# Patient Record
Sex: MALE | Race: WHITE | NOT HISPANIC OR LATINO | Employment: OTHER | URBAN - NONMETROPOLITAN AREA
[De-identification: names, ages, dates, MRNs, and addresses within clinical notes are randomized per-mention and may not be internally consistent; named-entity substitution may affect disease eponyms.]

---

## 2023-01-27 PROBLEM — K21.9 GASTROESOPHAGEAL REFLUX DISEASE: Status: ACTIVE | Noted: 2023-01-27

## 2023-01-27 PROBLEM — R19.00 PELVIC MASS IN MALE: Status: ACTIVE | Noted: 2023-01-27

## 2023-01-27 PROBLEM — F32.1 DEPRESSION, MAJOR, SINGLE EPISODE, MODERATE (MULTI): Status: ACTIVE | Noted: 2023-01-27

## 2023-01-27 PROBLEM — M48.061 NEUROFORAMINAL STENOSIS OF LUMBAR SPINE: Status: ACTIVE | Noted: 2023-01-27

## 2023-01-27 PROBLEM — I10 BENIGN ESSENTIAL HTN: Status: ACTIVE | Noted: 2023-01-27

## 2023-01-27 PROBLEM — D64.9 CHRONIC ANEMIA: Status: ACTIVE | Noted: 2023-01-27

## 2023-01-27 PROBLEM — E03.8 SUBCLINICAL HYPOTHYROIDISM: Status: ACTIVE | Noted: 2023-01-27

## 2023-01-27 PROBLEM — K44.9 HIATAL HERNIA: Status: ACTIVE | Noted: 2023-01-27

## 2023-01-27 PROBLEM — E55.9 VITAMIN D DEFICIENCY: Status: ACTIVE | Noted: 2023-01-27

## 2023-01-27 PROBLEM — M25.852 MASS OF JOINT OF LEFT HIP: Status: ACTIVE | Noted: 2023-01-27

## 2023-01-27 PROBLEM — L82.1 SEBORRHEIC KERATOSES: Status: ACTIVE | Noted: 2023-01-27

## 2023-01-27 PROBLEM — M79.2: Status: ACTIVE | Noted: 2023-01-27

## 2023-01-27 PROBLEM — K63.5 POLYP OF COLON: Status: ACTIVE | Noted: 2023-01-27

## 2023-01-27 PROBLEM — S46.212A RUPTURE OF LEFT PROXIMAL BICEPS TENDON: Status: ACTIVE | Noted: 2023-01-27

## 2023-01-27 PROBLEM — L57.0 ACTINIC KERATOSIS: Status: ACTIVE | Noted: 2023-01-27

## 2023-01-27 PROBLEM — C44.90 SKIN CANCER: Status: ACTIVE | Noted: 2023-01-27

## 2023-01-27 PROBLEM — K44.9 HERNIA, PARAESOPHAGEAL: Status: ACTIVE | Noted: 2023-01-27

## 2023-01-27 PROBLEM — K43.9 ABDOMINAL WALL HERNIA: Status: ACTIVE | Noted: 2023-01-27

## 2023-01-27 PROBLEM — F41.8 SITUATIONAL ANXIETY: Status: ACTIVE | Noted: 2023-01-27

## 2023-01-27 PROBLEM — G89.29 OTHER CHRONIC PAIN: Status: ACTIVE | Noted: 2023-01-27

## 2023-01-27 PROBLEM — N18.30 STAGE 3 CHRONIC KIDNEY DISEASE (MULTI): Status: ACTIVE | Noted: 2023-01-27

## 2023-01-27 PROBLEM — E78.5 HYPERLIPIDEMIA: Status: ACTIVE | Noted: 2023-01-27

## 2023-01-27 PROBLEM — I71.20 THORACIC AORTIC ANEURYSM WITHOUT RUPTURE (CMS-HCC): Status: ACTIVE | Noted: 2023-01-27

## 2023-01-27 RX ORDER — L. ACIDOPHILUS/L.BULGARICUS 1MM CELL
TABLET ORAL
COMMUNITY
Start: 2021-01-05 | End: 2023-05-02 | Stop reason: SDUPTHER

## 2023-01-27 RX ORDER — LOSARTAN POTASSIUM 50 MG/1
1 TABLET ORAL DAILY
COMMUNITY
Start: 2022-03-06 | End: 2023-09-08

## 2023-01-27 RX ORDER — FINASTERIDE 5 MG/1
1 TABLET, FILM COATED ORAL DAILY
COMMUNITY
End: 2023-10-19 | Stop reason: SDUPTHER

## 2023-01-27 RX ORDER — ISOSORBIDE MONONITRATE 30 MG/1
1 TABLET, EXTENDED RELEASE ORAL DAILY
COMMUNITY
Start: 2021-10-18 | End: 2023-11-15 | Stop reason: SDUPTHER

## 2023-01-27 RX ORDER — ASPIRIN 81 MG/1
1 TABLET ORAL DAILY
COMMUNITY
Start: 2021-01-05

## 2023-01-27 RX ORDER — FAMOTIDINE 40 MG/1
1 TABLET, FILM COATED ORAL NIGHTLY
COMMUNITY
Start: 2022-07-07 | End: 2023-05-25 | Stop reason: SDUPTHER

## 2023-01-27 RX ORDER — HYDROCODONE BITARTRATE AND ACETAMINOPHEN 7.5; 325 MG/1; MG/1
1 TABLET ORAL 4 TIMES DAILY
COMMUNITY
End: 2023-04-05 | Stop reason: RX

## 2023-01-27 RX ORDER — CARVEDILOL 6.25 MG/1
2 TABLET ORAL 2 TIMES DAILY
COMMUNITY
Start: 2019-12-16 | End: 2023-05-03 | Stop reason: SDUPTHER

## 2023-01-27 RX ORDER — SIMVASTATIN 40 MG/1
1 TABLET, FILM COATED ORAL NIGHTLY
COMMUNITY
Start: 2019-12-16 | End: 2023-09-20 | Stop reason: SDUPTHER

## 2023-01-27 RX ORDER — ALLOPURINOL 100 MG/1
1 TABLET ORAL DAILY
COMMUNITY
End: 2023-05-03 | Stop reason: SDUPTHER

## 2023-01-27 RX ORDER — NITROGLYCERIN 0.4 MG/1
0.4 TABLET SUBLINGUAL EVERY 5 MIN PRN
COMMUNITY
Start: 2022-01-12

## 2023-01-27 RX ORDER — AZATHIOPRINE 50 MG/1
0.5 TABLET ORAL EVERY OTHER DAY
COMMUNITY
Start: 2021-07-26 | End: 2023-05-25 | Stop reason: SDUPTHER

## 2023-01-27 RX ORDER — BUPROPION HYDROCHLORIDE 150 MG/1
1 TABLET, EXTENDED RELEASE ORAL 2 TIMES DAILY
COMMUNITY
Start: 2019-12-16 | End: 2023-09-08 | Stop reason: SDUPTHER

## 2023-01-27 RX ORDER — OMEPRAZOLE 40 MG/1
1 CAPSULE, DELAYED RELEASE ORAL DAILY
COMMUNITY
Start: 2019-12-16 | End: 2023-03-30 | Stop reason: SDUPTHER

## 2023-01-27 RX ORDER — POLYETHYLENE GLYCOL 3350, SODIUM SULFATE, SODIUM CHLORIDE, POTASSIUM CHLORIDE, ASCORBIC ACID, SODIUM ASCORBATE 140-9-5.2G
KIT ORAL
COMMUNITY
Start: 2022-08-09 | End: 2023-05-02 | Stop reason: ALTCHOICE

## 2023-01-27 RX ORDER — ERGOCALCIFEROL 1.25 MG/1
1 CAPSULE ORAL
COMMUNITY
Start: 2021-01-05 | End: 2023-05-02 | Stop reason: ALTCHOICE

## 2023-01-27 RX ORDER — TAMSULOSIN HYDROCHLORIDE 0.4 MG/1
1 CAPSULE ORAL NIGHTLY
COMMUNITY
Start: 2019-12-16 | End: 2023-09-08 | Stop reason: SDUPTHER

## 2023-02-24 LAB
CREATININE (MG/DL) IN SER/PLAS: 1.71 MG/DL (ref 0.5–1.3)
GFR MALE: 43 ML/MIN/1.73M2
UREA NITROGEN (MG/DL) IN SER/PLAS: 12 MG/DL (ref 6–23)

## 2023-03-10 PROBLEM — Z96.649 PAIN IN HIP REGION AFTER TOTAL HIP REPLACEMENT (CMS-HCC): Status: ACTIVE | Noted: 2023-03-10

## 2023-03-10 PROBLEM — R07.89 PRESSURE IN CHEST: Status: ACTIVE | Noted: 2023-03-10

## 2023-03-10 PROBLEM — Z96.60 HISTORY OF JOINT REPLACEMENT: Status: ACTIVE | Noted: 2023-03-10

## 2023-03-10 PROBLEM — M79.604 PAIN OF RIGHT LOWER EXTREMITY: Status: ACTIVE | Noted: 2023-03-10

## 2023-03-10 PROBLEM — S36.81XA: Status: ACTIVE | Noted: 2023-03-10

## 2023-03-10 PROBLEM — R10.9 ABDOMINAL WALL PAIN: Status: ACTIVE | Noted: 2023-03-10

## 2023-03-10 PROBLEM — R11.0 NAUSEA IN ADULT: Status: ACTIVE | Noted: 2023-03-10

## 2023-03-10 PROBLEM — T84.84XA PAIN IN HIP REGION AFTER TOTAL HIP REPLACEMENT (CMS-HCC): Status: ACTIVE | Noted: 2023-03-10

## 2023-03-10 PROBLEM — R10.11 RIGHT UPPER QUADRANT ABDOMINAL PAIN: Status: ACTIVE | Noted: 2023-03-10

## 2023-03-10 PROBLEM — F11.90 OPIOID USE: Status: ACTIVE | Noted: 2023-03-10

## 2023-03-10 RX ORDER — CALCITRIOL 0.25 UG/1
0.25 CAPSULE ORAL 3 TIMES WEEKLY
COMMUNITY
End: 2023-04-05

## 2023-03-10 RX ORDER — SELENIUM 50 MCG
1 TABLET ORAL EVERY OTHER DAY
COMMUNITY
End: 2024-01-11 | Stop reason: SDUPTHER

## 2023-03-10 RX ORDER — ONDANSETRON 4 MG/1
4 TABLET, FILM COATED ORAL EVERY 8 HOURS PRN
COMMUNITY
Start: 2022-09-08 | End: 2023-06-14 | Stop reason: ALTCHOICE

## 2023-03-10 RX ORDER — ASPIRIN 325 MG
50000 TABLET, DELAYED RELEASE (ENTERIC COATED) ORAL
COMMUNITY

## 2023-03-10 RX ORDER — FLUTICASONE PROPIONATE 50 MCG
1 SPRAY, SUSPENSION (ML) NASAL DAILY PRN
COMMUNITY

## 2023-03-13 ENCOUNTER — OFFICE VISIT (OUTPATIENT)
Dept: PRIMARY CARE | Facility: CLINIC | Age: 68
End: 2023-03-13
Payer: MEDICARE

## 2023-03-13 VITALS
DIASTOLIC BLOOD PRESSURE: 86 MMHG | SYSTOLIC BLOOD PRESSURE: 134 MMHG | HEART RATE: 78 BPM | HEIGHT: 67 IN | WEIGHT: 131 LBS | OXYGEN SATURATION: 97 % | BODY MASS INDEX: 20.56 KG/M2

## 2023-03-13 DIAGNOSIS — K44.9 HIATAL HERNIA: ICD-10-CM

## 2023-03-13 DIAGNOSIS — E55.9 VITAMIN D DEFICIENCY: ICD-10-CM

## 2023-03-13 DIAGNOSIS — I10 BENIGN ESSENTIAL HTN: Primary | ICD-10-CM

## 2023-03-13 DIAGNOSIS — N18.31 STAGE 3A CHRONIC KIDNEY DISEASE (MULTI): ICD-10-CM

## 2023-03-13 DIAGNOSIS — D64.9 CHRONIC ANEMIA: ICD-10-CM

## 2023-03-13 DIAGNOSIS — R10.9 ABDOMINAL WALL PAIN: ICD-10-CM

## 2023-03-13 DIAGNOSIS — I71.21 ANEURYSM OF ASCENDING AORTA WITHOUT RUPTURE (CMS-HCC): ICD-10-CM

## 2023-03-13 DIAGNOSIS — F32.1 DEPRESSION, MAJOR, SINGLE EPISODE, MODERATE (MULTI): ICD-10-CM

## 2023-03-13 DIAGNOSIS — E03.8 SUBCLINICAL HYPOTHYROIDISM: ICD-10-CM

## 2023-03-13 DIAGNOSIS — M25.852 MASS OF JOINT OF LEFT HIP: ICD-10-CM

## 2023-03-13 PROCEDURE — 3075F SYST BP GE 130 - 139MM HG: CPT | Performed by: FAMILY MEDICINE

## 2023-03-13 PROCEDURE — 1160F RVW MEDS BY RX/DR IN RCRD: CPT | Performed by: FAMILY MEDICINE

## 2023-03-13 PROCEDURE — 1170F FXNL STATUS ASSESSED: CPT | Performed by: FAMILY MEDICINE

## 2023-03-13 PROCEDURE — 1159F MED LIST DOCD IN RCRD: CPT | Performed by: FAMILY MEDICINE

## 2023-03-13 PROCEDURE — G0439 PPPS, SUBSEQ VISIT: HCPCS | Performed by: FAMILY MEDICINE

## 2023-03-13 PROCEDURE — 3079F DIAST BP 80-89 MM HG: CPT | Performed by: FAMILY MEDICINE

## 2023-03-13 PROCEDURE — 1036F TOBACCO NON-USER: CPT | Performed by: FAMILY MEDICINE

## 2023-03-13 PROCEDURE — 3008F BODY MASS INDEX DOCD: CPT | Performed by: FAMILY MEDICINE

## 2023-03-13 RX ORDER — OXYCODONE HYDROCHLORIDE 5 MG/1
5 TABLET ORAL EVERY 6 HOURS PRN
COMMUNITY
End: 2023-04-05 | Stop reason: SDUPTHER

## 2023-03-13 ASSESSMENT — ACTIVITIES OF DAILY LIVING (ADL)
MANAGING_FINANCES: INDEPENDENT
DOING_HOUSEWORK: INDEPENDENT
DRESSING: INDEPENDENT
BATHING: INDEPENDENT
GROCERY_SHOPPING: INDEPENDENT
TAKING_MEDICATION: INDEPENDENT

## 2023-03-13 ASSESSMENT — PATIENT HEALTH QUESTIONNAIRE - PHQ9
SUM OF ALL RESPONSES TO PHQ9 QUESTIONS 1 AND 2: 0
2. FEELING DOWN, DEPRESSED OR HOPELESS: NOT AT ALL
1. LITTLE INTEREST OR PLEASURE IN DOING THINGS: NOT AT ALL
1. LITTLE INTEREST OR PLEASURE IN DOING THINGS: NOT AT ALL
2. FEELING DOWN, DEPRESSED OR HOPELESS: NOT AT ALL
SUM OF ALL RESPONSES TO PHQ9 QUESTIONS 1 AND 2: 0

## 2023-03-13 NOTE — PROGRESS NOTES
Subjective   Patient ID: Shahdi Us is a 67 y.o. male who presents for Medicare Annual Wellness Visit Subsequent (3 month medication check, sore throat and b/l earache x1 week, check sore spot on bottom of left foot.).    HPI   Hip pain and arthritis in back. 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 At this point just observing.  Dr Joseph as needed.  Now pain in right hip Still in pain from 3-8. Switched to oxycodone due to lack of availability of [place].  Drops about 4. Will last about 3 hours which is longer.  Can maintain most normal activity with the meds. Had colonoscopy for polyp and subsequent bleeding in abdomen. That has reoslved on last CT.   Had hemoglobin down to 7.8 then up to 11.8 which is baseline      Hypertension and hyperlipidemia - Blood pressure has been 130s/70s No side effects. No Chest pain, Dyspnea, palpitations, numbness, weakness, edema, claudication, or double vision/ loss of vision. Dr Perez changed him from amlodipine to Losartan. No swelling with losartan. And back on simvastatin with good Lipid in June.      Depression and situation anxiety is doing well on current medications. Had been losing weight at about 20 pounds over last 1 1/2 years. Now stable at BMI 20 which is actually up a bit.  Appetite was off since he had COVID-19 but better now. . Funny taste is better. He has mildly low proteins and mild CKD with anemia in June.  Still abdomen pain but CT did not show anything new to explain tht      Gout has not been an issue on allopurinol.  UA 4.4      CKD 3a and transplant 1979 - Stable at 51 last  time. Was in 40s. No blood. Mild anemia. Azthtioprine. Dr Perez follows     Subclinical hypothyroidism last year. TSH normal last  time      Vitamin D deficiency good on supplement weekly. 52 in June      GERD and HH - No HB, Melena, dysphagia, or hematochezia. On Omeprazole and famotidine.  Has diffuse abdomen pain.. Comes and goes. BM OK. No change with food    "  Chronic anemia - stable last time      AKs on nose and ears and hands - Dr Casanova cut one on hand, right arm and neck. The one on hand SCC.  The others benign      TAA 4.3 in 2022 but only 4.1 in September last year.      Ruptured biceps tendon - did see NP with Dr Joseph's office and was given option to repair or just leave it. It is feeling better. No change in strength. Still able to carry bucket.     Spinal stenosis - keeps awake and pain meds help that .      UDS 8/6/21 as expected for medication profile 9/12/22 as expected for medication profile  CSA 9/12/22  PSA 12/13/22  Colonoscopy 10/28/22     I have personally reviewed the patients OARRS report. This report is filed in the EHR. I have considered the risks of abuse, addiction and diversion. I believe it is clinically appropriate to continue to prescribe this medication.    Review of Systems    Objective   /86 (BP Location: Right arm, Patient Position: Sitting)   Pulse 78   Ht 1.695 m (5' 6.75\")   Wt 59.4 kg (131 lb)   SpO2 97%   BMI 20.67 kg/m²     Physical Exam  Constitutional:       Appearance: Normal appearance. He is not ill-appearing.   HENT:      Right Ear: Tympanic membrane, ear canal and external ear normal.      Left Ear: Tympanic membrane, ear canal and external ear normal.      Mouth/Throat:      Mouth: Mucous membranes are moist.      Pharynx: Oropharynx is clear. No posterior oropharyngeal erythema.      Tonsils: No tonsillar exudate.   Eyes:      Pupils: Pupils are equal, round, and reactive to light.   Neck:      Vascular: No carotid bruit.   Cardiovascular:      Rate and Rhythm: Normal rate and regular rhythm.      Pulses:           Posterior tibial pulses are 3+ on the right side and 3+ on the left side.      Heart sounds: Normal heart sounds.   Pulmonary:      Breath sounds: Normal breath sounds and air entry. No decreased breath sounds, wheezing, rhonchi or rales.   Abdominal:      General: Abdomen is flat. Bowel sounds are " normal.      Palpations: Abdomen is soft.      Tenderness: There is no abdominal tenderness.      Comments: Has solid mass LLQ and Kidney RLQ    Musculoskeletal:      Cervical back: Full passive range of motion without pain.      Right lower leg: No edema.      Left lower leg: No edema.   Lymphadenopathy:      Cervical: No cervical adenopathy.   Skin:     General: Skin is warm and dry.      Comments: Multiple AK on face and ears.   Shaved of callus on left forefoot    Neurological:      Mental Status: He is alert and oriented to person, place, and time.   Psychiatric:         Mood and Affect: Mood normal.         Behavior: Behavior normal.         Thought Content: Thought content normal.         Judgment: Judgment normal.         Assessment/Plan

## 2023-03-30 ENCOUNTER — TELEPHONE (OUTPATIENT)
Dept: PRIMARY CARE | Facility: CLINIC | Age: 68
End: 2023-03-30
Payer: MEDICARE

## 2023-03-30 DIAGNOSIS — K21.9 GASTROESOPHAGEAL REFLUX DISEASE, UNSPECIFIED WHETHER ESOPHAGITIS PRESENT: ICD-10-CM

## 2023-03-30 RX ORDER — OMEPRAZOLE 40 MG/1
40 CAPSULE, DELAYED RELEASE ORAL
Qty: 30 CAPSULE | Refills: 11 | Status: SHIPPED | OUTPATIENT
Start: 2023-03-30 | End: 2024-04-16 | Stop reason: SDUPTHER

## 2023-04-03 DIAGNOSIS — N18.30 CHRONIC KIDNEY DISEASE, STAGE 3 UNSPECIFIED (MULTI): ICD-10-CM

## 2023-04-03 DIAGNOSIS — G89.29 OTHER CHRONIC PAIN: ICD-10-CM

## 2023-04-05 RX ORDER — CALCITRIOL 0.25 UG/1
0.25 CAPSULE ORAL EVERY OTHER DAY
Qty: 45 CAPSULE | Refills: 3 | Status: SHIPPED | OUTPATIENT
Start: 2023-04-05 | End: 2023-05-02 | Stop reason: ALTCHOICE

## 2023-04-05 RX ORDER — OXYCODONE HYDROCHLORIDE 5 MG/1
5 TABLET ORAL EVERY 6 HOURS PRN
Qty: 120 TABLET | Refills: 0 | Status: SHIPPED | OUTPATIENT
Start: 2023-04-05 | End: 2023-05-03 | Stop reason: SDUPTHER

## 2023-04-17 ENCOUNTER — TELEPHONE (OUTPATIENT)
Dept: PRIMARY CARE | Facility: CLINIC | Age: 68
End: 2023-04-17
Payer: MEDICARE

## 2023-04-17 NOTE — TELEPHONE ENCOUNTER
Reporting pain in side into groin where he had a procedure completed, should he make an appointment or go to ED?

## 2023-04-18 ENCOUNTER — APPOINTMENT (OUTPATIENT)
Dept: PRIMARY CARE | Facility: CLINIC | Age: 68
End: 2023-04-18
Payer: MEDICARE

## 2023-05-02 ENCOUNTER — OFFICE VISIT (OUTPATIENT)
Dept: PRIMARY CARE | Facility: CLINIC | Age: 68
End: 2023-05-02
Payer: MEDICARE

## 2023-05-02 VITALS
WEIGHT: 132 LBS | DIASTOLIC BLOOD PRESSURE: 84 MMHG | HEART RATE: 73 BPM | OXYGEN SATURATION: 97 % | SYSTOLIC BLOOD PRESSURE: 138 MMHG | BODY MASS INDEX: 20.83 KG/M2

## 2023-05-02 DIAGNOSIS — R10.31 CHRONIC RIGHT LOWER QUADRANT PAIN: Primary | ICD-10-CM

## 2023-05-02 DIAGNOSIS — G89.29 CHRONIC RIGHT LOWER QUADRANT PAIN: Primary | ICD-10-CM

## 2023-05-02 DIAGNOSIS — L82.1 KERATOSIS, SEBORRHEIC: ICD-10-CM

## 2023-05-02 PROCEDURE — 1159F MED LIST DOCD IN RCRD: CPT | Performed by: FAMILY MEDICINE

## 2023-05-02 PROCEDURE — 1160F RVW MEDS BY RX/DR IN RCRD: CPT | Performed by: FAMILY MEDICINE

## 2023-05-02 PROCEDURE — 3075F SYST BP GE 130 - 139MM HG: CPT | Performed by: FAMILY MEDICINE

## 2023-05-02 PROCEDURE — 1036F TOBACCO NON-USER: CPT | Performed by: FAMILY MEDICINE

## 2023-05-02 PROCEDURE — 3079F DIAST BP 80-89 MM HG: CPT | Performed by: FAMILY MEDICINE

## 2023-05-02 PROCEDURE — 3008F BODY MASS INDEX DOCD: CPT | Performed by: FAMILY MEDICINE

## 2023-05-02 PROCEDURE — 99214 OFFICE O/P EST MOD 30 MIN: CPT | Performed by: FAMILY MEDICINE

## 2023-05-02 ASSESSMENT — PATIENT HEALTH QUESTIONNAIRE - PHQ9
2. FEELING DOWN, DEPRESSED OR HOPELESS: NOT AT ALL
1. LITTLE INTEREST OR PLEASURE IN DOING THINGS: NOT AT ALL
SUM OF ALL RESPONSES TO PHQ9 QUESTIONS 1 AND 2: 0

## 2023-05-02 NOTE — PROGRESS NOTES
Subjective   Patient ID: Shahid Us is a 67 y.o. male who presents for Follow-up (ER for lower abdominal pain, check spot on top of head).    HPI   To ER on 4/18/23 due to pain in RLQ into the groin.   Had been having the pain off and on for several months  Got worse that day  Bowels WNL  No blood or melena  BM and Urination diminish pain afterwards  No change while urinating or BM  Had CT and labs done  CT with same iliopsoas mass on left.  Large hiatal hernia with colon and stomach  CBC, CMP, Lipase, UA, lactate All unremarkable  Testicle does seem to hurt.     The pain is still coming and going and today not too bad  No fever or chills or nausea or   Seems to have started after the colonoscopy last October when he had bleeding from the procedure in spite of no biopsy   Felt terrible afterwards and admitted for a few days to observe.       Review of Systems    Objective   /84 (BP Location: Left arm, Patient Position: Sitting)   Pulse 73   Wt 59.9 kg (132 lb)   SpO2 97%   BMI 20.83 kg/m²     Physical Exam  Constitutional:       General: He is not in acute distress.     Appearance: Normal appearance. He is normal weight. He is not ill-appearing.   HENT:      Head: Normocephalic.   Cardiovascular:      Rate and Rhythm: Normal rate and regular rhythm.      Heart sounds: No murmur heard.  Pulmonary:      Effort: Pulmonary effort is normal. No respiratory distress.      Breath sounds: Normal breath sounds. No wheezing or rhonchi.   Abdominal:      General: Abdomen is flat.      Palpations: Abdomen is soft. There is mass (LLQ as before).      Tenderness: There is no abdominal tenderness.   Genitourinary:     Penis: Normal.       Testes: Normal.      Comments: No swelling or tenderness of epididymi.   Atrophy of testicles.   Does have an incisional hernia of the site where the kidney was placed.   Skin:     Findings: Lesion (on top of head verruciform lesion with waxy surface about 8 mm) present.    Neurological:      Mental Status: He is alert.         Assessment/Plan   Problem List Items Addressed This Visit       Chronic right lower quadrant pain - Primary  This appears to be due to scar tissue. In the future it will still come and go. As long as still having BM and urination can observe. If distention then needs eval again.     Seborrheic keratosis - this is benign but can be cauterized if bothersome.

## 2023-05-03 ENCOUNTER — TELEPHONE (OUTPATIENT)
Dept: PRIMARY CARE | Facility: CLINIC | Age: 68
End: 2023-05-03
Payer: MEDICARE

## 2023-05-03 DIAGNOSIS — Z87.39 HISTORY OF GOUT: ICD-10-CM

## 2023-05-03 DIAGNOSIS — Z87.39 PERSONAL HISTORY OF OTHER DISEASES OF THE MUSCULOSKELETAL SYSTEM AND CONNECTIVE TISSUE: ICD-10-CM

## 2023-05-03 DIAGNOSIS — Z86.79 HISTORY OF CAD (CORONARY ARTERY DISEASE): ICD-10-CM

## 2023-05-03 DIAGNOSIS — I10 HYPERTENSION, UNSPECIFIED TYPE: ICD-10-CM

## 2023-05-03 DIAGNOSIS — Z86.79 PERSONAL HISTORY OF OTHER DISEASES OF THE CIRCULATORY SYSTEM: ICD-10-CM

## 2023-05-03 DIAGNOSIS — G89.29 OTHER CHRONIC PAIN: ICD-10-CM

## 2023-05-03 RX ORDER — OXYCODONE HYDROCHLORIDE 5 MG/1
5 TABLET ORAL EVERY 6 HOURS PRN
Qty: 120 TABLET | Refills: 0 | Status: SHIPPED | OUTPATIENT
Start: 2023-05-03 | End: 2023-06-02 | Stop reason: SDUPTHER

## 2023-05-03 RX ORDER — CARVEDILOL 6.25 MG/1
12.5 TABLET ORAL 2 TIMES DAILY
Qty: 360 TABLET | Refills: 3 | Status: SHIPPED | OUTPATIENT
Start: 2023-05-03 | End: 2024-05-06 | Stop reason: SDUPTHER

## 2023-05-03 RX ORDER — ALLOPURINOL 100 MG/1
TABLET ORAL
Qty: 90 TABLET | Refills: 3 | OUTPATIENT
Start: 2023-05-03

## 2023-05-03 RX ORDER — CARVEDILOL 6.25 MG/1
TABLET ORAL
Qty: 360 TABLET | Refills: 3 | OUTPATIENT
Start: 2023-05-03

## 2023-05-03 RX ORDER — ALLOPURINOL 100 MG/1
100 TABLET ORAL DAILY
Qty: 90 TABLET | Refills: 3 | Status: SHIPPED | OUTPATIENT
Start: 2023-05-03 | End: 2024-05-06 | Stop reason: SDUPTHER

## 2023-05-03 NOTE — TELEPHONE ENCOUNTER
Pt. Called in wanting RF on oxycodone, allopurinol, and carvedilol.  [facility] pharmacy

## 2023-05-22 DIAGNOSIS — K21.9 GASTRO-ESOPHAGEAL REFLUX DISEASE WITHOUT ESOPHAGITIS: ICD-10-CM

## 2023-05-22 RX ORDER — FAMOTIDINE 40 MG/1
TABLET, FILM COATED ORAL
Qty: 90 TABLET | Refills: 3 | OUTPATIENT
Start: 2023-05-22

## 2023-05-24 DIAGNOSIS — N18.30 CHRONIC KIDNEY DISEASE, STAGE 3 UNSPECIFIED (MULTI): ICD-10-CM

## 2023-05-24 RX ORDER — AZATHIOPRINE 50 MG/1
TABLET ORAL
Qty: 23 TABLET | Refills: 2 | OUTPATIENT
Start: 2023-05-24

## 2023-05-25 ENCOUNTER — TELEPHONE (OUTPATIENT)
Dept: PRIMARY CARE | Facility: CLINIC | Age: 68
End: 2023-05-25
Payer: MEDICARE

## 2023-05-25 DIAGNOSIS — K21.9 GASTROESOPHAGEAL REFLUX DISEASE, UNSPECIFIED WHETHER ESOPHAGITIS PRESENT: ICD-10-CM

## 2023-05-25 DIAGNOSIS — N18.31 STAGE 3A CHRONIC KIDNEY DISEASE (MULTI): ICD-10-CM

## 2023-05-25 RX ORDER — FAMOTIDINE 40 MG/1
40 TABLET, FILM COATED ORAL NIGHTLY
Qty: 90 TABLET | Refills: 1 | Status: SHIPPED | OUTPATIENT
Start: 2023-05-25 | End: 2023-11-15 | Stop reason: SDUPTHER

## 2023-05-25 RX ORDER — AZATHIOPRINE 50 MG/1
25 TABLET ORAL EVERY OTHER DAY
Qty: 30 TABLET | Refills: 1 | Status: SHIPPED | OUTPATIENT
Start: 2023-05-25 | End: 2024-01-03 | Stop reason: SDUPTHER

## 2023-06-02 ENCOUNTER — TELEPHONE (OUTPATIENT)
Dept: PRIMARY CARE | Facility: CLINIC | Age: 68
End: 2023-06-02
Payer: MEDICARE

## 2023-06-02 DIAGNOSIS — G89.29 OTHER CHRONIC PAIN: ICD-10-CM

## 2023-06-02 RX ORDER — OXYCODONE HYDROCHLORIDE 5 MG/1
5 TABLET ORAL EVERY 6 HOURS PRN
Qty: 120 TABLET | Refills: 0 | Status: SHIPPED | OUTPATIENT
Start: 2023-06-02 | End: 2023-06-29 | Stop reason: SDUPTHER

## 2023-06-09 ENCOUNTER — LAB (OUTPATIENT)
Dept: LAB | Facility: LAB | Age: 68
End: 2023-06-09
Payer: MEDICARE

## 2023-06-09 DIAGNOSIS — E55.9 VITAMIN D DEFICIENCY: ICD-10-CM

## 2023-06-09 DIAGNOSIS — E03.8 SUBCLINICAL HYPOTHYROIDISM: ICD-10-CM

## 2023-06-09 DIAGNOSIS — I10 BENIGN ESSENTIAL HTN: ICD-10-CM

## 2023-06-09 DIAGNOSIS — D64.9 CHRONIC ANEMIA: ICD-10-CM

## 2023-06-09 DIAGNOSIS — N18.31 STAGE 3A CHRONIC KIDNEY DISEASE (MULTI): ICD-10-CM

## 2023-06-09 LAB
ALANINE AMINOTRANSFERASE (SGPT) (U/L) IN SER/PLAS: 7 U/L (ref 10–52)
ALBUMIN (G/DL) IN SER/PLAS: 3.7 G/DL (ref 3.4–5)
ALKALINE PHOSPHATASE (U/L) IN SER/PLAS: 72 U/L (ref 33–136)
ANION GAP IN SER/PLAS: 11 MMOL/L (ref 10–20)
ASPARTATE AMINOTRANSFERASE (SGOT) (U/L) IN SER/PLAS: 13 U/L (ref 9–39)
BILIRUBIN TOTAL (MG/DL) IN SER/PLAS: 0.5 MG/DL (ref 0–1.2)
CALCIDIOL (25 OH VITAMIN D3) (NG/ML) IN SER/PLAS: 47 NG/ML
CALCIUM (MG/DL) IN SER/PLAS: 8.9 MG/DL (ref 8.6–10.3)
CARBON DIOXIDE, TOTAL (MMOL/L) IN SER/PLAS: 26 MMOL/L (ref 21–32)
CHLORIDE (MMOL/L) IN SER/PLAS: 107 MMOL/L (ref 98–107)
CREATININE (MG/DL) IN SER/PLAS: 1.65 MG/DL (ref 0.5–1.3)
ERYTHROCYTE DISTRIBUTION WIDTH (RATIO) BY AUTOMATED COUNT: 13.2 % (ref 11.5–14.5)
ERYTHROCYTE MEAN CORPUSCULAR HEMOGLOBIN CONCENTRATION (G/DL) BY AUTOMATED: 32 G/DL (ref 32–36)
ERYTHROCYTE MEAN CORPUSCULAR VOLUME (FL) BY AUTOMATED COUNT: 92 FL (ref 80–100)
ERYTHROCYTES (10*6/UL) IN BLOOD BY AUTOMATED COUNT: 4.12 X10E12/L (ref 4.5–5.9)
GFR MALE: 45 ML/MIN/1.73M2
GLUCOSE (MG/DL) IN SER/PLAS: 101 MG/DL (ref 74–99)
HEMATOCRIT (%) IN BLOOD BY AUTOMATED COUNT: 37.8 % (ref 41–52)
HEMOGLOBIN (G/DL) IN BLOOD: 12.1 G/DL (ref 13.5–17.5)
LEUKOCYTES (10*3/UL) IN BLOOD BY AUTOMATED COUNT: 5.5 X10E9/L (ref 4.4–11.3)
PLATELETS (10*3/UL) IN BLOOD AUTOMATED COUNT: 226 X10E9/L (ref 150–450)
POTASSIUM (MMOL/L) IN SER/PLAS: 4.2 MMOL/L (ref 3.5–5.3)
PROTEIN TOTAL: 5.7 G/DL (ref 6.4–8.2)
SODIUM (MMOL/L) IN SER/PLAS: 140 MMOL/L (ref 136–145)
THYROTROPIN (MIU/L) IN SER/PLAS BY DETECTION LIMIT <= 0.05 MIU/L: 8.18 MIU/L (ref 0.44–3.98)
THYROXINE (T4) FREE (NG/DL) IN SER/PLAS: 0.76 NG/DL (ref 0.61–1.12)
UREA NITROGEN (MG/DL) IN SER/PLAS: 10 MG/DL (ref 6–23)

## 2023-06-09 PROCEDURE — 36415 COLL VENOUS BLD VENIPUNCTURE: CPT

## 2023-06-09 PROCEDURE — 85027 COMPLETE CBC AUTOMATED: CPT

## 2023-06-09 PROCEDURE — 82306 VITAMIN D 25 HYDROXY: CPT

## 2023-06-09 PROCEDURE — 84443 ASSAY THYROID STIM HORMONE: CPT

## 2023-06-09 PROCEDURE — 84439 ASSAY OF FREE THYROXINE: CPT

## 2023-06-09 PROCEDURE — 80053 COMPREHEN METABOLIC PANEL: CPT

## 2023-06-13 RX ORDER — LISINOPRIL 5 MG/1
10 TABLET ORAL DAILY
COMMUNITY
Start: 2019-11-19 | End: 2023-06-14 | Stop reason: SINTOL

## 2023-06-14 ENCOUNTER — OFFICE VISIT (OUTPATIENT)
Dept: PRIMARY CARE | Facility: CLINIC | Age: 68
End: 2023-06-14
Payer: MEDICARE

## 2023-06-14 VITALS
OXYGEN SATURATION: 98 % | BODY MASS INDEX: 20.83 KG/M2 | WEIGHT: 132 LBS | SYSTOLIC BLOOD PRESSURE: 138 MMHG | DIASTOLIC BLOOD PRESSURE: 88 MMHG | HEART RATE: 79 BPM

## 2023-06-14 DIAGNOSIS — I10 PRIMARY HYPERTENSION: ICD-10-CM

## 2023-06-14 DIAGNOSIS — T84.84XD PAIN IN HIP REGION AFTER TOTAL HIP REPLACEMENT, SUBSEQUENT ENCOUNTER: ICD-10-CM

## 2023-06-14 DIAGNOSIS — F32.1 DEPRESSION, MAJOR, SINGLE EPISODE, MODERATE (MULTI): ICD-10-CM

## 2023-06-14 DIAGNOSIS — K21.9 GASTROESOPHAGEAL REFLUX DISEASE WITHOUT ESOPHAGITIS: ICD-10-CM

## 2023-06-14 DIAGNOSIS — Z94.0 HISTORY OF KIDNEY TRANSPLANT (HHS-HCC): ICD-10-CM

## 2023-06-14 DIAGNOSIS — E03.8 SUBCLINICAL HYPOTHYROIDISM: Primary | ICD-10-CM

## 2023-06-14 DIAGNOSIS — E78.2 MIXED HYPERLIPIDEMIA: ICD-10-CM

## 2023-06-14 DIAGNOSIS — L57.0 ACTINIC KERATOSIS: ICD-10-CM

## 2023-06-14 DIAGNOSIS — Z96.649 PAIN IN HIP REGION AFTER TOTAL HIP REPLACEMENT, SUBSEQUENT ENCOUNTER: ICD-10-CM

## 2023-06-14 DIAGNOSIS — I71.21 ANEURYSM OF ASCENDING AORTA WITHOUT RUPTURE (CMS-HCC): ICD-10-CM

## 2023-06-14 DIAGNOSIS — M48.061 NEUROFORAMINAL STENOSIS OF LUMBAR SPINE: ICD-10-CM

## 2023-06-14 DIAGNOSIS — K44.9 HIATAL HERNIA: ICD-10-CM

## 2023-06-14 DIAGNOSIS — F11.90 OPIOID USE: ICD-10-CM

## 2023-06-14 DIAGNOSIS — I43 CARDIOMYOPATHY DUE TO HYPERTENSION, WITHOUT HEART FAILURE (MULTI): ICD-10-CM

## 2023-06-14 DIAGNOSIS — Z96.641 HISTORY OF RIGHT HIP REPLACEMENT: ICD-10-CM

## 2023-06-14 DIAGNOSIS — N18.31 STAGE 3A CHRONIC KIDNEY DISEASE (MULTI): ICD-10-CM

## 2023-06-14 DIAGNOSIS — M25.852 MASS OF JOINT OF LEFT HIP: ICD-10-CM

## 2023-06-14 DIAGNOSIS — K63.5 POLYP OF COLON, UNSPECIFIED PART OF COLON, UNSPECIFIED TYPE: ICD-10-CM

## 2023-06-14 DIAGNOSIS — I11.9 CARDIOMYOPATHY DUE TO HYPERTENSION, WITHOUT HEART FAILURE (MULTI): ICD-10-CM

## 2023-06-14 DIAGNOSIS — G89.29 OTHER CHRONIC PAIN: ICD-10-CM

## 2023-06-14 DIAGNOSIS — R10.31 CHRONIC RIGHT LOWER QUADRANT PAIN: ICD-10-CM

## 2023-06-14 DIAGNOSIS — L82.1 SEBORRHEIC KERATOSES: ICD-10-CM

## 2023-06-14 DIAGNOSIS — S46.212D RUPTURE OF LEFT PROXIMAL BICEPS TENDON, SUBSEQUENT ENCOUNTER: ICD-10-CM

## 2023-06-14 DIAGNOSIS — D64.9 CHRONIC ANEMIA: ICD-10-CM

## 2023-06-14 DIAGNOSIS — C44.90 SKIN CANCER: ICD-10-CM

## 2023-06-14 DIAGNOSIS — R19.00 PELVIC MASS IN MALE: ICD-10-CM

## 2023-06-14 DIAGNOSIS — G89.29 CHRONIC RIGHT LOWER QUADRANT PAIN: ICD-10-CM

## 2023-06-14 DIAGNOSIS — F41.8 SITUATIONAL ANXIETY: ICD-10-CM

## 2023-06-14 DIAGNOSIS — M79.604 PAIN OF RIGHT LOWER EXTREMITY: ICD-10-CM

## 2023-06-14 PROBLEM — R11.0 NAUSEA IN ADULT: Status: RESOLVED | Noted: 2023-03-10 | Resolved: 2023-06-14

## 2023-06-14 PROBLEM — R10.11 RIGHT UPPER QUADRANT ABDOMINAL PAIN: Status: RESOLVED | Noted: 2023-03-10 | Resolved: 2023-06-14

## 2023-06-14 PROBLEM — K43.9 ABDOMINAL WALL HERNIA: Status: RESOLVED | Noted: 2023-01-27 | Resolved: 2023-06-14

## 2023-06-14 PROBLEM — M79.2: Status: RESOLVED | Noted: 2023-01-27 | Resolved: 2023-06-14

## 2023-06-14 PROBLEM — R07.89 PRESSURE IN CHEST: Status: RESOLVED | Noted: 2023-03-10 | Resolved: 2023-06-14

## 2023-06-14 PROBLEM — S36.81XA: Status: RESOLVED | Noted: 2023-03-10 | Resolved: 2023-06-14

## 2023-06-14 PROBLEM — R10.9 ABDOMINAL WALL PAIN: Status: RESOLVED | Noted: 2023-03-10 | Resolved: 2023-06-14

## 2023-06-14 PROCEDURE — 1036F TOBACCO NON-USER: CPT | Performed by: FAMILY MEDICINE

## 2023-06-14 PROCEDURE — 3075F SYST BP GE 130 - 139MM HG: CPT | Performed by: FAMILY MEDICINE

## 2023-06-14 PROCEDURE — 1160F RVW MEDS BY RX/DR IN RCRD: CPT | Performed by: FAMILY MEDICINE

## 2023-06-14 PROCEDURE — 99214 OFFICE O/P EST MOD 30 MIN: CPT | Performed by: FAMILY MEDICINE

## 2023-06-14 PROCEDURE — 3008F BODY MASS INDEX DOCD: CPT | Performed by: FAMILY MEDICINE

## 2023-06-14 PROCEDURE — 3079F DIAST BP 80-89 MM HG: CPT | Performed by: FAMILY MEDICINE

## 2023-06-14 PROCEDURE — 1159F MED LIST DOCD IN RCRD: CPT | Performed by: FAMILY MEDICINE

## 2023-06-14 RX ORDER — LEVOTHYROXINE SODIUM 25 UG/1
25 TABLET ORAL DAILY
Qty: 90 TABLET | Refills: 3 | Status: SHIPPED | OUTPATIENT
Start: 2023-06-14 | End: 2023-06-15 | Stop reason: SDUPTHER

## 2023-06-14 ASSESSMENT — PATIENT HEALTH QUESTIONNAIRE - PHQ9
1. LITTLE INTEREST OR PLEASURE IN DOING THINGS: NOT AT ALL
SUM OF ALL RESPONSES TO PHQ9 QUESTIONS 1 AND 2: 0
2. FEELING DOWN, DEPRESSED OR HOPELESS: NOT AT ALL

## 2023-06-14 NOTE — PROGRESS NOTES
Subjective   Patient ID: Shahid Us is a 68 y.o. male who presents for Med Management.    HPI     Hip pain and arthritis in back. 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 At this point just observing.  Bilateral hip replacement. Left unrepaired biceps tendon rupture. Dr Joseph as needed.  Now pain in right hip Still in pain from 3-8. Switched to oxycodone due to lack of availability of [place].  Drops about 3. Will last about 4 hours which is longer.  Can maintain most normal activity with the meds.     Cardiomyopathy - EF in October 2022 45-50% global hypokinesis.     Had colonoscopy for polyp and subsequent bleeding in abdomen. That has reoslved on last CT.   Had hemoglobin down to 7.8 then, now 12.1. Colonoscopy 10/2022      Hypertension and hyperlipidemia - Blood pressure has been 120s/70s No side effects. No Chest pain, Dyspnea, palpitations, numbness, weakness, edema, claudication, or double vision/ loss of vision. Dr Perez changed him from amlodipine to Losartan. No swelling with losartan. And back on simvastatin with good Lipid one year ago.       Depression and situation anxiety is doing well on current medications. Had been losing weight at about 20 pounds over last 1 1/2 years. Now stable at BMI 20 which is actually up a bit.       Gout has not been an issue on allopurinol.  UA 4.4 last time.      CKD 3a and transplant 1979 - GFR 45. Last check was 50. No blood. Mild anemia. Azathioprine. Dr Perez follows     Subclinical Hypothyroid - was 5.4 last year, then 3.78, now 8.18. Free T4 0.76. Reports cold intolerance, fatigue, joint aches, not on levothyroxine/synthroid.. Will start since symptoms       Vitamin D deficiency good on supplement weekly. 52 in June      GERD and HH - No HB, Melena, dysphagia, or hematochezia. On Omeprazole and famotidine.  Has diffuse abdomen pain.. Comes and goes. BM OK. No change with food     Chronic anemia - HGB 12.1     AKs on nose and ears and hands -  Dr Casanova cut one on hand, right arm and neck. The one on hand SCC.  The others benign      TAA 4.2 on 10/2022.      Ruptured biceps tendon - did see NP with Dr Joseph's office and was given option to repair or just leave it. It is feeling better. No change in strength. Still able to carry bucket.     Spinal stenosis - keeps awake and pain meds help that .      UDS 8/6/21 as expected for medication profile 9/12/22 as expected for medication profile  CSA 9/12/22  PSA 12/13/22  Colonoscopy 10/28/22    Review of Systems    Objective   /88 (BP Location: Right arm, Patient Position: Sitting)   Pulse 79   Wt 59.9 kg (132 lb)   SpO2 98%   BMI 20.83 kg/m²     Physical Exam  Constitutional:       Appearance: Normal appearance.   HENT:      Head: Normocephalic.      Right Ear: Tympanic membrane, ear canal and external ear normal.      Left Ear: Tympanic membrane, ear canal and external ear normal.      Nose: Nose normal.      Mouth/Throat:      Mouth: Mucous membranes are moist.   Eyes:      Pupils: Pupils are equal, round, and reactive to light.   Cardiovascular:      Rate and Rhythm: Normal rate and regular rhythm.      Pulses: Normal pulses.      Heart sounds: Normal heart sounds. No murmur heard.  Pulmonary:      Effort: Pulmonary effort is normal.      Breath sounds: Normal breath sounds.   Abdominal:      General: Abdomen is flat.      Palpations: Abdomen is soft. There is mass (left lower abdomen and Right lower abdomen masses. Not tender).   Musculoskeletal:      Cervical back: Normal range of motion.      Right lower leg: No edema.      Left lower leg: No edema.      Comments: Protuberance of left greater trochanter    Skin:     General: Skin is warm and dry.             Comments: Post operative scar   Neurological:      General: No focal deficit present.      Mental Status: He is alert and oriented to person, place, and time.   Psychiatric:         Mood and Affect: Mood normal.         Behavior: Behavior  normal.         Thought Content: Thought content normal.         Judgment: Judgment normal.         Assessment/Plan   Problem List Items Addressed This Visit       Actinic keratosis    Cardiomyopathy due to hypertension (CMS/HCC)    Chronic anemia    Chronic right lower quadrant pain    Depression, major, single episode, moderate (CMS/HCC)    Gastroesophageal reflux disease    Hiatal hernia    History of joint replacement    History of kidney transplant    Hyperlipidemia    Relevant Orders    Lipid Panel    Hypertension    Mass of joint of left hip    Neuroforaminal stenosis of lumbar spine    Opioid use    Other chronic pain    Pain in hip region after total hip replacement (CMS/HCC)    Pain of right lower extremity    Pelvic mass in male    Polyp of colon    Rupture of left proximal biceps tendon    Seborrheic keratoses    Situational anxiety    Skin cancer    Stage 3 chronic kidney disease (CMS/HCC)    Relevant Orders    Basic Metabolic Panel    Subclinical hypothyroidism - Primary    Relevant Medications    levothyroxine (Synthroid, Levoxyl) 25 mcg tablet    Other Relevant Orders    TSH with reflex to Free T4 if abnormal    Follow Up In Primary Care    Thoracic aortic aneurysm without rupture (CMS/HCC)        Patient initially seen by David Roy NP student. I reviewed history and examined patient independently and updated as needed.

## 2023-06-15 ENCOUNTER — TELEPHONE (OUTPATIENT)
Dept: PRIMARY CARE | Facility: CLINIC | Age: 68
End: 2023-06-15
Payer: MEDICARE

## 2023-06-15 DIAGNOSIS — E03.8 SUBCLINICAL HYPOTHYROIDISM: ICD-10-CM

## 2023-06-15 RX ORDER — LEVOTHYROXINE SODIUM 25 UG/1
25 TABLET ORAL DAILY
Qty: 90 TABLET | Refills: 3 | Status: SHIPPED | OUTPATIENT
Start: 2023-06-15 | End: 2023-06-23 | Stop reason: SINTOL

## 2023-06-17 DIAGNOSIS — N18.31 STAGE 3A CHRONIC KIDNEY DISEASE (MULTI): ICD-10-CM

## 2023-06-19 RX ORDER — AZATHIOPRINE 50 MG/1
25 TABLET ORAL EVERY OTHER DAY
Qty: 24 TABLET | Refills: 3 | OUTPATIENT
Start: 2023-06-19

## 2023-06-20 ENCOUNTER — APPOINTMENT (OUTPATIENT)
Dept: PRIMARY CARE | Facility: CLINIC | Age: 68
End: 2023-06-20
Payer: MEDICARE

## 2023-06-21 NOTE — TELEPHONE ENCOUNTER
Stating since starting the thyroid medication his hips are hurting and when he's outside in the sun it feels like its cooking him and when he goes inside his face looks burnt. Asking for a call back.

## 2023-06-23 ENCOUNTER — TELEPHONE (OUTPATIENT)
Dept: PRIMARY CARE | Facility: CLINIC | Age: 68
End: 2023-06-23
Payer: MEDICARE

## 2023-06-29 ENCOUNTER — TELEPHONE (OUTPATIENT)
Dept: PRIMARY CARE | Facility: CLINIC | Age: 68
End: 2023-06-29
Payer: MEDICARE

## 2023-06-29 DIAGNOSIS — G89.29 OTHER CHRONIC PAIN: ICD-10-CM

## 2023-06-29 RX ORDER — OXYCODONE HYDROCHLORIDE 5 MG/1
5 TABLET ORAL EVERY 6 HOURS PRN
Qty: 120 TABLET | Refills: 0 | Status: SHIPPED | OUTPATIENT
Start: 2023-06-29 | End: 2023-08-03 | Stop reason: SDUPTHER

## 2023-07-07 LAB
ANION GAP IN SER/PLAS: 9 MMOL/L (ref 10–20)
CALCIUM (MG/DL) IN SER/PLAS: 9.2 MG/DL (ref 8.6–10.3)
CARBON DIOXIDE, TOTAL (MMOL/L) IN SER/PLAS: 28 MMOL/L (ref 21–32)
CHLORIDE (MMOL/L) IN SER/PLAS: 105 MMOL/L (ref 98–107)
CREATININE (MG/DL) IN SER/PLAS: 1.84 MG/DL (ref 0.5–1.3)
GFR MALE: 40 ML/MIN/1.73M2
GLUCOSE (MG/DL) IN SER/PLAS: 106 MG/DL (ref 74–99)
POTASSIUM (MMOL/L) IN SER/PLAS: 4 MMOL/L (ref 3.5–5.3)
SODIUM (MMOL/L) IN SER/PLAS: 138 MMOL/L (ref 136–145)
UREA NITROGEN (MG/DL) IN SER/PLAS: 16 MG/DL (ref 6–23)

## 2023-07-17 ENCOUNTER — TELEPHONE (OUTPATIENT)
Dept: PRIMARY CARE | Facility: CLINIC | Age: 68
End: 2023-07-17

## 2023-07-17 DIAGNOSIS — N18.31 STAGE 3A CHRONIC KIDNEY DISEASE (MULTI): ICD-10-CM

## 2023-07-17 DIAGNOSIS — G89.29 OTHER CHRONIC PAIN: ICD-10-CM

## 2023-07-17 RX ORDER — AZATHIOPRINE 50 MG/1
25 TABLET ORAL EVERY OTHER DAY
Qty: 24 TABLET | Refills: 3 | OUTPATIENT
Start: 2023-07-17

## 2023-07-31 RX ORDER — OXYCODONE HYDROCHLORIDE 5 MG/1
5 TABLET ORAL EVERY 6 HOURS PRN
Qty: 120 TABLET | Refills: 0 | Status: CANCELLED | OUTPATIENT
Start: 2023-07-31 | End: 2023-08-30

## 2023-08-03 ENCOUNTER — TELEPHONE (OUTPATIENT)
Dept: PRIMARY CARE | Facility: CLINIC | Age: 68
End: 2023-08-03
Payer: MEDICARE

## 2023-08-03 DIAGNOSIS — G89.29 OTHER CHRONIC PAIN: ICD-10-CM

## 2023-08-03 RX ORDER — OXYCODONE HYDROCHLORIDE 5 MG/1
5 TABLET ORAL EVERY 6 HOURS PRN
Qty: 120 TABLET | Refills: 0 | Status: SHIPPED | OUTPATIENT
Start: 2023-08-03 | End: 2023-08-31 | Stop reason: SDUPTHER

## 2023-08-31 ENCOUNTER — TELEPHONE (OUTPATIENT)
Dept: PRIMARY CARE | Facility: CLINIC | Age: 68
End: 2023-08-31
Payer: MEDICARE

## 2023-08-31 DIAGNOSIS — Z86.79 PERSONAL HISTORY OF OTHER DISEASES OF THE CIRCULATORY SYSTEM: ICD-10-CM

## 2023-08-31 DIAGNOSIS — F32.1 MAJOR DEPRESSIVE DISORDER, SINGLE EPISODE, MODERATE (MULTI): ICD-10-CM

## 2023-08-31 DIAGNOSIS — G89.29 OTHER CHRONIC PAIN: ICD-10-CM

## 2023-08-31 RX ORDER — OXYCODONE HYDROCHLORIDE 5 MG/1
5 TABLET ORAL EVERY 6 HOURS PRN
Qty: 120 TABLET | Refills: 0 | Status: SHIPPED | OUTPATIENT
Start: 2023-08-31 | End: 2023-10-02 | Stop reason: SDUPTHER

## 2023-08-31 RX ORDER — BUPROPION HYDROCHLORIDE 150 MG/1
150 TABLET, EXTENDED RELEASE ORAL 2 TIMES DAILY
Qty: 180 TABLET | Refills: 3 | OUTPATIENT
Start: 2023-08-31

## 2023-08-31 RX ORDER — SIMVASTATIN 40 MG/1
40 TABLET, FILM COATED ORAL NIGHTLY
Qty: 90 TABLET | Refills: 3 | OUTPATIENT
Start: 2023-08-31

## 2023-09-05 PROBLEM — R10.9 RECURRENT ABDOMINAL PAIN: Status: ACTIVE | Noted: 2023-09-05

## 2023-09-05 RX ORDER — L. ACIDOPHILUS/L.BULGARICUS 1MM CELL
1 TABLET ORAL 2 TIMES DAILY
COMMUNITY
Start: 2021-01-05

## 2023-09-05 RX ORDER — ERGOCALCIFEROL 1.25 MG/1
1 CAPSULE ORAL WEEKLY
COMMUNITY
Start: 2021-01-05 | End: 2023-09-20 | Stop reason: SDUPTHER

## 2023-09-08 DIAGNOSIS — F32.1 DEPRESSION, MAJOR, SINGLE EPISODE, MODERATE (MULTI): ICD-10-CM

## 2023-09-08 DIAGNOSIS — N40.1 BENIGN PROSTATIC HYPERPLASIA WITH LOWER URINARY TRACT SYMPTOMS, SYMPTOM DETAILS UNSPECIFIED: ICD-10-CM

## 2023-09-08 DIAGNOSIS — I10 PRIMARY HYPERTENSION: ICD-10-CM

## 2023-09-08 RX ORDER — TAMSULOSIN HYDROCHLORIDE 0.4 MG/1
0.4 CAPSULE ORAL NIGHTLY
Qty: 90 CAPSULE | Refills: 1 | Status: SHIPPED | OUTPATIENT
Start: 2023-09-08 | End: 2024-02-27 | Stop reason: SDUPTHER

## 2023-09-08 RX ORDER — LOSARTAN POTASSIUM 50 MG/1
50 TABLET ORAL DAILY
Qty: 90 TABLET | Refills: 1 | Status: SHIPPED | OUTPATIENT
Start: 2023-09-08 | End: 2024-03-12 | Stop reason: SDUPTHER

## 2023-09-08 RX ORDER — BUPROPION HYDROCHLORIDE 150 MG/1
150 TABLET, EXTENDED RELEASE ORAL 2 TIMES DAILY
Qty: 180 TABLET | Refills: 1 | Status: SHIPPED | OUTPATIENT
Start: 2023-09-08 | End: 2024-03-29 | Stop reason: SDUPTHER

## 2023-09-08 NOTE — TELEPHONE ENCOUNTER
PT needs  a refill on tamsulosin (Flomax) 0.4 mg, buPROPion SR (Wellbutrin SR) 150 mg, and losartan (Cozaar) 50 mg sent to [facility] in [place]

## 2023-09-18 ENCOUNTER — LAB (OUTPATIENT)
Dept: LAB | Facility: LAB | Age: 68
End: 2023-09-18
Payer: MEDICARE

## 2023-09-18 DIAGNOSIS — E03.8 SUBCLINICAL HYPOTHYROIDISM: ICD-10-CM

## 2023-09-18 DIAGNOSIS — E78.2 MIXED HYPERLIPIDEMIA: ICD-10-CM

## 2023-09-18 DIAGNOSIS — N18.31 STAGE 3A CHRONIC KIDNEY DISEASE (MULTI): ICD-10-CM

## 2023-09-18 LAB
ANION GAP IN SER/PLAS: 12 MMOL/L (ref 10–20)
CALCIUM (MG/DL) IN SER/PLAS: 9.1 MG/DL (ref 8.6–10.3)
CARBON DIOXIDE, TOTAL (MMOL/L) IN SER/PLAS: 27 MMOL/L (ref 21–32)
CHLORIDE (MMOL/L) IN SER/PLAS: 108 MMOL/L (ref 98–107)
CHOLESTEROL (MG/DL) IN SER/PLAS: 109 MG/DL (ref 0–199)
CHOLESTEROL IN HDL (MG/DL) IN SER/PLAS: 53 MG/DL
CHOLESTEROL/HDL RATIO: 2.1
CREATININE (MG/DL) IN SER/PLAS: 1.43 MG/DL (ref 0.5–1.3)
GFR MALE: 54 ML/MIN/1.73M2
GLUCOSE (MG/DL) IN SER/PLAS: 95 MG/DL (ref 74–99)
LDL: 43 MG/DL (ref 0–99)
POTASSIUM (MMOL/L) IN SER/PLAS: 3.8 MMOL/L (ref 3.5–5.3)
SODIUM (MMOL/L) IN SER/PLAS: 143 MMOL/L (ref 136–145)
THYROTROPIN (MIU/L) IN SER/PLAS BY DETECTION LIMIT <= 0.05 MIU/L: 8.16 MIU/L (ref 0.44–3.98)
THYROXINE (T4) FREE (NG/DL) IN SER/PLAS: 0.79 NG/DL (ref 0.61–1.12)
TRIGLYCERIDE (MG/DL) IN SER/PLAS: 66 MG/DL (ref 0–149)
UREA NITROGEN (MG/DL) IN SER/PLAS: 9 MG/DL (ref 6–23)
VLDL: 13 MG/DL (ref 0–40)

## 2023-09-18 PROCEDURE — 84443 ASSAY THYROID STIM HORMONE: CPT

## 2023-09-18 PROCEDURE — 36415 COLL VENOUS BLD VENIPUNCTURE: CPT

## 2023-09-18 PROCEDURE — 80061 LIPID PANEL: CPT

## 2023-09-18 PROCEDURE — 84439 ASSAY OF FREE THYROXINE: CPT

## 2023-09-18 PROCEDURE — 80048 BASIC METABOLIC PNL TOTAL CA: CPT

## 2023-09-20 ENCOUNTER — OFFICE VISIT (OUTPATIENT)
Dept: PRIMARY CARE | Facility: CLINIC | Age: 68
End: 2023-09-20
Payer: MEDICARE

## 2023-09-20 VITALS
SYSTOLIC BLOOD PRESSURE: 138 MMHG | DIASTOLIC BLOOD PRESSURE: 80 MMHG | HEART RATE: 71 BPM | WEIGHT: 137 LBS | BODY MASS INDEX: 21.62 KG/M2 | OXYGEN SATURATION: 97 %

## 2023-09-20 DIAGNOSIS — R10.31 CHRONIC RIGHT LOWER QUADRANT PAIN: ICD-10-CM

## 2023-09-20 DIAGNOSIS — I71.21 ANEURYSM OF ASCENDING AORTA WITHOUT RUPTURE (CMS-HCC): ICD-10-CM

## 2023-09-20 DIAGNOSIS — D64.9 CHRONIC ANEMIA: ICD-10-CM

## 2023-09-20 DIAGNOSIS — G89.29 CHRONIC RIGHT LOWER QUADRANT PAIN: ICD-10-CM

## 2023-09-20 DIAGNOSIS — I43 CARDIOMYOPATHY DUE TO HYPERTENSION, WITHOUT HEART FAILURE (MULTI): ICD-10-CM

## 2023-09-20 DIAGNOSIS — F32.1 DEPRESSION, MAJOR, SINGLE EPISODE, MODERATE (MULTI): ICD-10-CM

## 2023-09-20 DIAGNOSIS — R19.00 PELVIC MASS IN MALE: ICD-10-CM

## 2023-09-20 DIAGNOSIS — M25.852 MASS OF JOINT OF LEFT HIP: ICD-10-CM

## 2023-09-20 DIAGNOSIS — M48.061 NEUROFORAMINAL STENOSIS OF LUMBAR SPINE: ICD-10-CM

## 2023-09-20 DIAGNOSIS — E78.2 MIXED HYPERLIPIDEMIA: ICD-10-CM

## 2023-09-20 DIAGNOSIS — F41.8 SITUATIONAL ANXIETY: ICD-10-CM

## 2023-09-20 DIAGNOSIS — I10 PRIMARY HYPERTENSION: ICD-10-CM

## 2023-09-20 DIAGNOSIS — Z79.899 MEDICATION MANAGEMENT: Primary | ICD-10-CM

## 2023-09-20 DIAGNOSIS — E03.8 SUBCLINICAL HYPOTHYROIDISM: ICD-10-CM

## 2023-09-20 DIAGNOSIS — L57.0 ACTINIC KERATOSIS: ICD-10-CM

## 2023-09-20 DIAGNOSIS — K63.5 POLYP OF COLON, UNSPECIFIED PART OF COLON, UNSPECIFIED TYPE: ICD-10-CM

## 2023-09-20 DIAGNOSIS — I11.9 CARDIOMYOPATHY DUE TO HYPERTENSION, WITHOUT HEART FAILURE (MULTI): ICD-10-CM

## 2023-09-20 DIAGNOSIS — K21.9 GASTROESOPHAGEAL REFLUX DISEASE WITHOUT ESOPHAGITIS: ICD-10-CM

## 2023-09-20 DIAGNOSIS — C44.90 SKIN CANCER: ICD-10-CM

## 2023-09-20 DIAGNOSIS — Z94.0 HISTORY OF KIDNEY TRANSPLANT (HHS-HCC): ICD-10-CM

## 2023-09-20 DIAGNOSIS — N18.31 STAGE 3A CHRONIC KIDNEY DISEASE (MULTI): ICD-10-CM

## 2023-09-20 DIAGNOSIS — S46.212D RUPTURE OF LEFT PROXIMAL BICEPS TENDON, SUBSEQUENT ENCOUNTER: ICD-10-CM

## 2023-09-20 PROBLEM — Z96.649 PAIN IN HIP REGION AFTER TOTAL HIP REPLACEMENT (CMS-HCC): Status: RESOLVED | Noted: 2023-03-10 | Resolved: 2023-09-20

## 2023-09-20 PROBLEM — F11.90 OPIOID USE: Status: RESOLVED | Noted: 2023-03-10 | Resolved: 2023-09-20

## 2023-09-20 PROBLEM — L82.1 SEBORRHEIC KERATOSES: Status: RESOLVED | Noted: 2023-01-27 | Resolved: 2023-09-20

## 2023-09-20 PROBLEM — T84.84XA PAIN IN HIP REGION AFTER TOTAL HIP REPLACEMENT (CMS-HCC): Status: RESOLVED | Noted: 2023-03-10 | Resolved: 2023-09-20

## 2023-09-20 PROBLEM — R10.9 RECURRENT ABDOMINAL PAIN: Status: RESOLVED | Noted: 2023-09-05 | Resolved: 2023-09-20

## 2023-09-20 PROBLEM — M79.604 PAIN OF RIGHT LOWER EXTREMITY: Status: RESOLVED | Noted: 2023-03-10 | Resolved: 2023-09-20

## 2023-09-20 PROCEDURE — 3075F SYST BP GE 130 - 139MM HG: CPT | Performed by: FAMILY MEDICINE

## 2023-09-20 PROCEDURE — 80368 SEDATIVE HYPNOTICS: CPT

## 2023-09-20 PROCEDURE — 3079F DIAST BP 80-89 MM HG: CPT | Performed by: FAMILY MEDICINE

## 2023-09-20 PROCEDURE — 80346 BENZODIAZEPINES1-12: CPT

## 2023-09-20 PROCEDURE — 3008F BODY MASS INDEX DOCD: CPT | Performed by: FAMILY MEDICINE

## 2023-09-20 PROCEDURE — 80373 DRUG SCREENING TRAMADOL: CPT

## 2023-09-20 PROCEDURE — 99214 OFFICE O/P EST MOD 30 MIN: CPT | Performed by: FAMILY MEDICINE

## 2023-09-20 PROCEDURE — 1160F RVW MEDS BY RX/DR IN RCRD: CPT | Performed by: FAMILY MEDICINE

## 2023-09-20 PROCEDURE — 80324 DRUG SCREEN AMPHETAMINES 1/2: CPT

## 2023-09-20 PROCEDURE — 1159F MED LIST DOCD IN RCRD: CPT | Performed by: FAMILY MEDICINE

## 2023-09-20 PROCEDURE — 80354 DRUG SCREENING FENTANYL: CPT

## 2023-09-20 PROCEDURE — 80361 OPIATES 1 OR MORE: CPT

## 2023-09-20 PROCEDURE — 1125F AMNT PAIN NOTED PAIN PRSNT: CPT | Performed by: FAMILY MEDICINE

## 2023-09-20 PROCEDURE — 80307 DRUG TEST PRSMV CHEM ANLYZR: CPT

## 2023-09-20 PROCEDURE — 80358 DRUG SCREENING METHADONE: CPT

## 2023-09-20 PROCEDURE — 80365 DRUG SCREENING OXYCODONE: CPT

## 2023-09-20 PROCEDURE — 1036F TOBACCO NON-USER: CPT | Performed by: FAMILY MEDICINE

## 2023-09-20 RX ORDER — SIMVASTATIN 40 MG/1
40 TABLET, FILM COATED ORAL NIGHTLY
Qty: 90 TABLET | Refills: 3 | Status: SHIPPED | OUTPATIENT
Start: 2023-09-20 | End: 2024-09-19

## 2023-09-20 NOTE — PROGRESS NOTES
Subjective   Patient ID: Shahid Us is a 68 y.o. male who presents for Med Management.    HPI   Hip pain and arthritis in back. 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 At this point just observing.  Bilateral hip replacement. Left unrepaired biceps tendon rupture. Dr Joseph as needed.  Now pain in right hip Still in pain from 3-8. Switched to oxycodone due to lack of availability of [place].  Drops about 5. Will last about 5 hours which is longer.  Can maintain most normal activity with the meds. The enlargement on the leg seems to have disappeared.      Cardiomyopathy - EF in October 2022 45-50% global hypokinesis.      Had colonoscopy for polyp and subsequent bleeding in abdomen. That has reoslved on last CT.   Had hemoglobin down to 7.8 then, in June 12.1. Colonoscopy 10/2022      Hypertension and hyperlipidemia - Blood pressure has been 120s/70s No side effects. No Chest pain, Dyspnea, palpitations, numbness, weakness, edema, claudication, or double vision/ loss of vision. Dr Perez changed him from amlodipine to Losartan. No swelling with losartan. And back on simvastatin with good Lipid this time.       Depression and situation anxiety is doing well on current medications. Had been losing weight at about 20 pounds over last 1 1/2 years. Now stable at BMI 21  which is actually up a bit.       Gout has not been an issue on allopurinol.  UA 4.4 in January      CKD 3a and transplant 1979 - GFR 45. Last check was 50. No blood. Mild anemia. Azathioprine. Dr Perez follows     Subclinical Hypothyroid - was 8.16 this time.  Free T4 0.79.  Reports cold intolerance, fatigue, joint aches, not on levothyroxine/synthroid. Started it but made legs achy.      Vitamin D deficiency good on supplement weekly. 52 in June      GERD and HH - No HB, Melena, dysphagia, or hematochezia. On Omeprazole and famotidine.  Has diffuse abdomen pain.. Comes and goes. BM OK. No change with food     Chronic anemia - HGB  12.1 last time     AKs on nose and ears and hands - Dr Casanova cut one on hand, right arm and neck. The one on hand SCC.  The others benign      TAA 4.2 on 10/2022. Saw Dr Mckeon a couple weeks ago. Can go every 1-2 years since stable      Ruptured biceps tendon - did see NP with Dr Joseph's office and was given option to repair or just leave it. It is feeling better. No change in strength. Still able to carry bucket.     Spinal stenosis - keeps awake and pain meds help that.      Testicles hurt off and on. No mass      UDS 8/6/21 as expected for medication profile 9/12/22 as expected for medication profile 9/20/23 as expected for medication profile   CSA 9/20/23  PSA 12/13/22  Colonoscopy 10/28/22 Clear and due in 2027.. Polyp in 2021.     Review of Systems    Objective   /80 (BP Location: Left arm, Patient Position: Sitting)   Pulse 71   Wt 62.1 kg (137 lb)   SpO2 97%   BMI 21.62 kg/m²     Physical Exam  Vitals reviewed.   Constitutional:       General: He is not in acute distress.     Appearance: Normal appearance.   HENT:      Head: Normocephalic.      Right Ear: Tympanic membrane normal.      Left Ear: Tympanic membrane normal.      Nose: Nose normal.      Mouth/Throat:      Pharynx: Oropharynx is clear.   Eyes:      Extraocular Movements: Extraocular movements intact.      Conjunctiva/sclera: Conjunctivae normal.      Pupils: Pupils are equal, round, and reactive to light.   Neck:      Vascular: No carotid bruit.   Cardiovascular:      Rate and Rhythm: Normal rate and regular rhythm.      Pulses: Normal pulses.      Heart sounds: Normal heart sounds. No murmur heard.  Pulmonary:      Effort: Pulmonary effort is normal. No respiratory distress.      Breath sounds: Normal breath sounds.   Abdominal:      General: Abdomen is flat. Bowel sounds are normal. There is no distension.      Palpations: Abdomen is soft. There is mass (hard as before in LLQ.).      Tenderness: There is no abdominal tenderness.    Genitourinary:     Penis: Normal.       Comments: Testes down bilaterally but right atrophied. Both a bit tender. No hernia.   Musculoskeletal:      Cervical back: Normal range of motion and neck supple. No tenderness.      Comments: The prominence of the left hip is gone.    Lymphadenopathy:      Cervical: No cervical adenopathy.   Skin:     General: Skin is warm and dry.      Findings: No rash.   Neurological:      General: No focal deficit present.      Mental Status: He is alert and oriented to person, place, and time.   Psychiatric:         Mood and Affect: Mood normal.         Thought Content: Thought content normal.         Judgment: Judgment normal.         Assessment/Plan   Problem List Items Addressed This Visit       Actinic keratosis    Cardiomyopathy due to hypertension (CMS/HCC)    Chronic anemia    Chronic right lower quadrant pain    Depression, major, single episode, moderate (CMS/HCC)    Gastroesophageal reflux disease    History of kidney transplant    Hyperlipidemia    Relevant Medications    simvastatin (Zocor) 40 mg tablet    Hypertension    Mass of joint of left hip    Neuroforaminal stenosis of lumbar spine    Relevant Orders    Follow Up In Primary Care - Established    Pelvic mass in male    Polyp of colon    Rupture of left proximal biceps tendon    Situational anxiety    Skin cancer    Stage 3 chronic kidney disease (CMS/HCC)    Subclinical hypothyroidism    Thoracic aortic aneurysm without rupture (CMS/HCC)     Other Visit Diagnoses       Medication management    -  Primary    Relevant Orders    Opiate/Opioid/Benzo Extended Prescription Compliance

## 2023-09-22 ENCOUNTER — TELEPHONE (OUTPATIENT)
Dept: PRIMARY CARE | Facility: CLINIC | Age: 68
End: 2023-09-22
Payer: MEDICARE

## 2023-09-22 DIAGNOSIS — N40.1 BENIGN PROSTATIC HYPERPLASIA WITH LOWER URINARY TRACT SYMPTOMS: ICD-10-CM

## 2023-09-22 DIAGNOSIS — G89.29 OTHER CHRONIC PAIN: ICD-10-CM

## 2023-09-22 LAB
6-ACETYLMORPHINE: <25 NG/ML
7-AMINOCLONAZEPAM: <25 NG/ML
ALPHA-HYDROXYALPRAZOLAM: <25 NG/ML
ALPHA-HYDROXYMIDAZOLAM: <25 NG/ML
ALPRAZOLAM: <25 NG/ML
AMPHETAMINE (PRESENCE) IN URINE BY SCREEN METHOD: ABNORMAL
BARBITURATES PRESENCE IN URINE BY SCREEN METHOD: ABNORMAL
CANNABINOIDS IN URINE BY SCREEN METHOD: ABNORMAL
CHLORDIAZEPOXIDE: <25 NG/ML
CLONAZEPAM: <25 NG/ML
COCAINE (PRESENCE) IN URINE BY SCREEN METHOD: ABNORMAL
CODEINE: <50 NG/ML
CREATINE, URINE FOR DRUG: 202.7 MG/DL
DIAZEPAM: <25 NG/ML
DRUG SCREEN COMMENT URINE: ABNORMAL
EDDP: <25 NG/ML
FENTANYL CONFIRMATION, URINE: <2.5 NG/ML
HYDROCODONE: <25 NG/ML
HYDROMORPHONE: <25 NG/ML
LORAZEPAM: <25 NG/ML
METHADONE CONFIRMATION,URINE: <25 NG/ML
MIDAZOLAM: <25 NG/ML
MORPHINE URINE: <50 NG/ML
NORDIAZEPAM: <25 NG/ML
NORFENTANYL: <2.5 NG/ML
NORHYDROCODONE: <25 NG/ML
NOROXYCODONE: >1000 NG/ML
O-DESMETHYLTRAMADOL: <50 NG/ML
OXAZEPAM: <25 NG/ML
OXYCODONE: >2500 NG/ML
OXYMORPHONE: 243 NG/ML
PHENCYCLIDINE (PRESENCE) IN URINE BY SCREEN METHOD: ABNORMAL
TEMAZEPAM: <25 NG/ML
TRAMADOL: <50 NG/ML
ZOLPIDEM METABOLITE (ZCA): <25 NG/ML
ZOLPIDEM: <25 NG/ML

## 2023-09-22 RX ORDER — FINASTERIDE 5 MG/1
5 TABLET, FILM COATED ORAL DAILY
Qty: 90 TABLET | Refills: 3 | OUTPATIENT
Start: 2023-09-22

## 2023-09-25 LAB
AMPHETAMINES,URINE: <50 NG/ML
MDA,URINE: <200 NG/ML
MDEA,URINE: <200 NG/ML
MDMA,UR: <200 NG/ML
METHAMPHETAMINE QUANTITATIVE URINE: <200 NG/ML
PHENTERMINE,UR: <200 NG/ML

## 2023-10-02 RX ORDER — OXYCODONE HYDROCHLORIDE 5 MG/1
5 TABLET ORAL EVERY 6 HOURS PRN
Qty: 120 TABLET | Refills: 0 | Status: SHIPPED | OUTPATIENT
Start: 2023-10-02 | End: 2023-11-02 | Stop reason: SDUPTHER

## 2023-10-16 DIAGNOSIS — N40.1 BENIGN PROSTATIC HYPERPLASIA WITH LOWER URINARY TRACT SYMPTOMS, SYMPTOM DETAILS UNSPECIFIED: ICD-10-CM

## 2023-10-16 DIAGNOSIS — N18.31 STAGE 3A CHRONIC KIDNEY DISEASE (MULTI): ICD-10-CM

## 2023-10-16 RX ORDER — AZATHIOPRINE 50 MG/1
25 TABLET ORAL EVERY OTHER DAY
Qty: 24 TABLET | Refills: 3 | OUTPATIENT
Start: 2023-10-16

## 2023-10-19 RX ORDER — FINASTERIDE 5 MG/1
5 TABLET, FILM COATED ORAL DAILY
Qty: 30 TABLET | Refills: 11 | Status: SHIPPED | OUTPATIENT
Start: 2023-10-19 | End: 2024-10-18

## 2023-11-02 ENCOUNTER — TELEPHONE (OUTPATIENT)
Dept: PRIMARY CARE | Facility: CLINIC | Age: 68
End: 2023-11-02
Payer: MEDICARE

## 2023-11-02 DIAGNOSIS — G89.29 OTHER CHRONIC PAIN: ICD-10-CM

## 2023-11-02 RX ORDER — OXYCODONE HYDROCHLORIDE 5 MG/1
5 TABLET ORAL EVERY 6 HOURS PRN
Qty: 120 TABLET | Refills: 0 | Status: SHIPPED | OUTPATIENT
Start: 2023-11-02 | End: 2023-11-29 | Stop reason: SDUPTHER

## 2023-11-02 NOTE — TELEPHONE ENCOUNTER
He was working on a [employer] on Sunday and hit his head on metal.  He doesn't know if he has a concussion, as his head is still hurting today.  Can he use ice or heat?  Ok to take Tylenol between Oxycodone doses?    Please advise if he needs to do anything else

## 2023-11-02 NOTE — TELEPHONE ENCOUNTER
Pc to patient and let him know since it's been 48 hours since incident, he can use ice or heat to his head, whichever feels best.  He can take tylenol between oxycodone doses if needed.  If symptoms worsen or he develops any other symptoms, I advised him to go to ER for evaluation.  Patient verbalized understanding.

## 2023-11-10 ENCOUNTER — TELEPHONE (OUTPATIENT)
Dept: PRIMARY CARE | Facility: CLINIC | Age: 68
End: 2023-11-10
Payer: MEDICARE

## 2023-11-10 NOTE — TELEPHONE ENCOUNTER
Stating he cracked his head about 3 weeks ago. His head is still bothering him. Asking if he has a concusion if it's normal. Asking for a call back. Please advise in Dr. Talley's absence.

## 2023-11-14 RX ORDER — AMOXICILLIN 500 MG/1
1000 CAPSULE ORAL ONCE
COMMUNITY
Start: 2023-09-21 | End: 2023-12-20 | Stop reason: WASHOUT

## 2023-11-15 ENCOUNTER — OFFICE VISIT (OUTPATIENT)
Dept: PRIMARY CARE | Facility: CLINIC | Age: 68
End: 2023-11-15
Payer: MEDICARE

## 2023-11-15 VITALS
DIASTOLIC BLOOD PRESSURE: 82 MMHG | WEIGHT: 127 LBS | OXYGEN SATURATION: 97 % | SYSTOLIC BLOOD PRESSURE: 142 MMHG | BODY MASS INDEX: 20.04 KG/M2 | HEART RATE: 75 BPM

## 2023-11-15 DIAGNOSIS — C44.90 SKIN CANCER: ICD-10-CM

## 2023-11-15 DIAGNOSIS — L57.0 ACTINIC KERATOSIS: ICD-10-CM

## 2023-11-15 DIAGNOSIS — I10 PRIMARY HYPERTENSION: ICD-10-CM

## 2023-11-15 DIAGNOSIS — S00.03XA CONTUSION OF PARIETAL REGION OF SCALP, INITIAL ENCOUNTER: Primary | ICD-10-CM

## 2023-11-15 DIAGNOSIS — K21.9 GASTROESOPHAGEAL REFLUX DISEASE, UNSPECIFIED WHETHER ESOPHAGITIS PRESENT: ICD-10-CM

## 2023-11-15 PROCEDURE — 1159F MED LIST DOCD IN RCRD: CPT | Performed by: FAMILY MEDICINE

## 2023-11-15 PROCEDURE — 3079F DIAST BP 80-89 MM HG: CPT | Performed by: FAMILY MEDICINE

## 2023-11-15 PROCEDURE — 3077F SYST BP >= 140 MM HG: CPT | Performed by: FAMILY MEDICINE

## 2023-11-15 PROCEDURE — 99213 OFFICE O/P EST LOW 20 MIN: CPT | Performed by: FAMILY MEDICINE

## 2023-11-15 PROCEDURE — 1160F RVW MEDS BY RX/DR IN RCRD: CPT | Performed by: FAMILY MEDICINE

## 2023-11-15 PROCEDURE — 1036F TOBACCO NON-USER: CPT | Performed by: FAMILY MEDICINE

## 2023-11-15 PROCEDURE — 1125F AMNT PAIN NOTED PAIN PRSNT: CPT | Performed by: FAMILY MEDICINE

## 2023-11-15 RX ORDER — FAMOTIDINE 40 MG/1
40 TABLET, FILM COATED ORAL NIGHTLY
Qty: 90 TABLET | Refills: 3 | Status: SHIPPED | OUTPATIENT
Start: 2023-11-15

## 2023-11-15 RX ORDER — ISOSORBIDE MONONITRATE 30 MG/1
30 TABLET, EXTENDED RELEASE ORAL DAILY
Qty: 90 TABLET | Refills: 3 | Status: SHIPPED | OUTPATIENT
Start: 2023-11-15 | End: 2024-11-14

## 2023-11-15 NOTE — PROGRESS NOTES
Subjective   Patient ID: Shahid Us is a 68 y.o. male who presents for evaluate (Possible concussion, Hit heat 4 weeks ago, still hurts).    HPI   Was under a wagon 4 weeks ago and stood up and hit head.   No LOC or seeing stars or whoozy. Just hurt  No focal or weakness or Loss of vision  Still sore and tender   Able to touch it now  Some times in forehead and in the back.   A knot at the time  Oxycodone helps sleep with this.       Review of Systems    Objective   /82 (BP Location: Left arm, Patient Position: Sitting)   Pulse 75   Wt 57.6 kg (127 lb)   SpO2 97%   BMI 20.04 kg/m²     Physical Exam  Constitutional:       Appearance: Normal appearance.   Musculoskeletal:         General: Tenderness (top of skull) present. No swelling or deformity.      Cervical back: Normal range of motion. No rigidity or tenderness.   Skin:     General: Skin is warm and dry.      Findings: Lesion (right ear with keratinized lesion on auricle and scaly area) present. No bruising.   Neurological:      General: No focal deficit present.      Mental Status: He is alert and oriented to person, place, and time.      Cranial Nerves: Cranial nerves 2-12 are intact.      Sensory: Sensation is intact.      Motor: Motor function is intact.      Coordination: Coordination is intact.      Deep Tendon Reflexes: Reflexes are normal and symmetric.   Psychiatric:         Mood and Affect: Mood normal.         Behavior: Behavior normal.         Thought Content: Thought content normal.         Judgment: Judgment normal.         Assessment/Plan   Problem List Items Addressed This Visit             ICD-10-CM    Actinic keratosis L57.0    Gastroesophageal reflux disease K21.9    Relevant Medications    famotidine (Pepcid) 40 mg tablet    Hypertension I10    Relevant Medications    isosorbide mononitrate ER (Imdur) 30 mg 24 hr tablet    Skin cancer C44.90     Other Visit Diagnoses         Codes    Contusion of parietal region of scalp,  initial encounter    -  Primary S00.03XA        The scalp seems to be a contusion only. No concussion. May take up to 2 months to resolve     To dermatology for the ear lesion.

## 2023-11-28 DIAGNOSIS — I10 PRIMARY HYPERTENSION: ICD-10-CM

## 2023-11-28 DIAGNOSIS — G89.29 OTHER CHRONIC PAIN: ICD-10-CM

## 2023-11-28 RX ORDER — ISOSORBIDE MONONITRATE 30 MG/1
30 TABLET, EXTENDED RELEASE ORAL DAILY
Qty: 90 TABLET | Refills: 3 | OUTPATIENT
Start: 2023-11-28

## 2023-11-29 RX ORDER — OXYCODONE HYDROCHLORIDE 5 MG/1
5 TABLET ORAL EVERY 6 HOURS PRN
Qty: 120 TABLET | Refills: 0 | Status: SHIPPED | OUTPATIENT
Start: 2023-11-29 | End: 2023-12-20 | Stop reason: WASHOUT

## 2023-11-29 NOTE — TELEPHONE ENCOUNTER
Please send refill to [facility]    oxyCODONE (Roxicodone) 5 mg immediate release tablet [[id number]]    Order Details  Dose: 5 mg Route: oral Frequency: Every 6 hours PRN for severe pain (7 - 10)   Dispense Quantity: 120 tablet Refills: 0          Sig: Take 1 tablet (5 mg) by mouth every 6 hours if needed for severe pain (7 - 10).         Start Date: 11/02/23 End Date: 12/02/23   Written Date: 11/02/23 Rx Expiration Date: 01/01/24    Earliest Fill Date: 11/02/23

## 2023-12-20 ENCOUNTER — OFFICE VISIT (OUTPATIENT)
Dept: PRIMARY CARE | Facility: CLINIC | Age: 68
End: 2023-12-20
Payer: MEDICARE

## 2023-12-20 VITALS
OXYGEN SATURATION: 95 % | WEIGHT: 128.2 LBS | DIASTOLIC BLOOD PRESSURE: 78 MMHG | TEMPERATURE: 97.2 F | SYSTOLIC BLOOD PRESSURE: 130 MMHG | HEART RATE: 72 BPM | BODY MASS INDEX: 20.23 KG/M2

## 2023-12-20 DIAGNOSIS — E03.8 SUBCLINICAL HYPOTHYROIDISM: ICD-10-CM

## 2023-12-20 DIAGNOSIS — M48.061 NEUROFORAMINAL STENOSIS OF LUMBAR SPINE: ICD-10-CM

## 2023-12-20 DIAGNOSIS — F32.1 DEPRESSION, MAJOR, SINGLE EPISODE, MODERATE (MULTI): ICD-10-CM

## 2023-12-20 DIAGNOSIS — I43 CARDIOMYOPATHY DUE TO HYPERTENSION, WITHOUT HEART FAILURE (MULTI): ICD-10-CM

## 2023-12-20 DIAGNOSIS — L57.0 ACTINIC KERATOSIS: Primary | ICD-10-CM

## 2023-12-20 DIAGNOSIS — I71.21 ANEURYSM OF ASCENDING AORTA WITHOUT RUPTURE (CMS-HCC): ICD-10-CM

## 2023-12-20 DIAGNOSIS — N18.31 STAGE 3A CHRONIC KIDNEY DISEASE (MULTI): ICD-10-CM

## 2023-12-20 DIAGNOSIS — E78.2 MIXED HYPERLIPIDEMIA: ICD-10-CM

## 2023-12-20 DIAGNOSIS — M25.852 MASS OF JOINT OF LEFT HIP: ICD-10-CM

## 2023-12-20 DIAGNOSIS — I11.9 CARDIOMYOPATHY DUE TO HYPERTENSION, WITHOUT HEART FAILURE (MULTI): ICD-10-CM

## 2023-12-20 DIAGNOSIS — S46.212D RUPTURE OF LEFT PROXIMAL BICEPS TENDON, SUBSEQUENT ENCOUNTER: ICD-10-CM

## 2023-12-20 DIAGNOSIS — E55.9 VITAMIN D DEFICIENCY: ICD-10-CM

## 2023-12-20 DIAGNOSIS — Z96.641 HISTORY OF RIGHT HIP REPLACEMENT: ICD-10-CM

## 2023-12-20 DIAGNOSIS — G89.29 OTHER CHRONIC PAIN: ICD-10-CM

## 2023-12-20 DIAGNOSIS — Z94.0 HISTORY OF KIDNEY TRANSPLANT (HHS-HCC): ICD-10-CM

## 2023-12-20 DIAGNOSIS — F41.8 SITUATIONAL ANXIETY: ICD-10-CM

## 2023-12-20 DIAGNOSIS — K21.9 GASTROESOPHAGEAL REFLUX DISEASE WITHOUT ESOPHAGITIS: ICD-10-CM

## 2023-12-20 DIAGNOSIS — D64.9 CHRONIC ANEMIA: ICD-10-CM

## 2023-12-20 DIAGNOSIS — K63.5 POLYP OF COLON, UNSPECIFIED PART OF COLON, UNSPECIFIED TYPE: ICD-10-CM

## 2023-12-20 DIAGNOSIS — I10 PRIMARY HYPERTENSION: ICD-10-CM

## 2023-12-20 DIAGNOSIS — K44.9 HIATAL HERNIA: ICD-10-CM

## 2023-12-20 PROCEDURE — 99214 OFFICE O/P EST MOD 30 MIN: CPT | Performed by: FAMILY MEDICINE

## 2023-12-20 PROCEDURE — 1160F RVW MEDS BY RX/DR IN RCRD: CPT | Performed by: FAMILY MEDICINE

## 2023-12-20 PROCEDURE — 3078F DIAST BP <80 MM HG: CPT | Performed by: FAMILY MEDICINE

## 2023-12-20 PROCEDURE — 1159F MED LIST DOCD IN RCRD: CPT | Performed by: FAMILY MEDICINE

## 2023-12-20 PROCEDURE — 3075F SYST BP GE 130 - 139MM HG: CPT | Performed by: FAMILY MEDICINE

## 2023-12-20 PROCEDURE — 1125F AMNT PAIN NOTED PAIN PRSNT: CPT | Performed by: FAMILY MEDICINE

## 2023-12-20 PROCEDURE — 1036F TOBACCO NON-USER: CPT | Performed by: FAMILY MEDICINE

## 2023-12-20 RX ORDER — OXYCODONE HYDROCHLORIDE 5 MG/1
5 TABLET ORAL EVERY 6 HOURS PRN
Qty: 120 TABLET | Refills: 0 | Status: CANCELLED | OUTPATIENT
Start: 2023-12-20 | End: 2024-01-19

## 2023-12-20 RX ORDER — HYDROCODONE BITARTRATE AND ACETAMINOPHEN 7.5; 325 MG/1; MG/1
1 TABLET ORAL 4 TIMES DAILY
Qty: 120 TABLET | Refills: 0 | Status: SHIPPED | OUTPATIENT
Start: 2023-12-20 | End: 2024-01-25 | Stop reason: SDUPTHER

## 2023-12-20 NOTE — PROGRESS NOTES
Subjective   Patient ID: Shahid Us is a 68 y.o. male who presents for chronic pain (3 mo /Med refill oxycodone).    HPI   Hip pain and arthritis in back. 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 At this point just observing.  Bilateral hip replacement. Left unrepaired biceps tendon rupture. Dr Joseph as needed.  Now pain is back in left hip. Still in pain from 4-8. Switched to oxycodone due to lack of availability of Norco.  Felt better on Norco.  Drops about 3. Will last about 4 hours.  Can maintain most normal activity with the meds. The enlargement on the leg seems to have disappeared.      Cardiomyopathy - EF in October 2022 45-50% global hypokinesis.      Had colonoscopy for polyp and subsequent bleeding in abdomen. That has resolved on last CT.   Had hemoglobin down to 7.8 then, in June 12.1. Colonoscopy 10/2022      Hypertension and hyperlipidemia - Blood pressure has been 120s/70s Will go low at times to 105 and does not feel well. No side effects. No Chest pain, Dyspnea, palpitations, numbness, weakness, edema, claudication, or double vision/ loss of vision. Dr Perez changed him from amlodipine to Losartan. No swelling with losartan. And back on simvastatin with good Lipid this time.  Cardiology to change next time and may change meds      Depression and situation anxiety is doing well on current medications. Had been losing weight at about 20 pounds over last 1 1/2 years. Now stable at BMI 20  which is up a bit from a month ago.       Gout has not been an issue on allopurinol.  UA 4.4 in January      CKD 3a and transplant 1979 - GFR 45. Last check was 54. No blood. Mild anemia. Azathioprine. Dr Perez follows.      Subclinical Hypothyroid - was 8.16 this time.  Free T4 0.79.  Reports cold intolerance, fatigue, joint aches, not on levothyroxine/synthroid. Started it but made legs achy.      Vitamin D deficiency good on supplement weekly. 52 in June      GERD and HH - No HB, Melena,  dysphagia, or hematochezia. On Omeprazole and famotidine.  Has diffuse abdomen pain.. Comes and goes. BM OK. No change with food     Chronic anemia - HGB 12.1 last time     AKs on nose and ears and hands - Dr Casanova cut one on hand, right arm and neck. The one on hand SCC.  The others benign. To see again in January.      TAA 4.2 on 10/2022. Saw Dr Mckeon a few months ago. . Can go every 1-2 years since stable      Ruptured biceps tendon - did see NP with Dr Joseph's office and was given option to repair or just leave it. It is feeling better. No change in strength. Still able to carry bucket.     Spinal stenosis - keeps awake and pain meds help that.       Testicles hurt off and on. No mass     Will see what labs are done by Dr Perez and then supplement next visit is needed      UDS 8/6/21 as expected for medication profile 9/12/22 as expected for medication profile 9/20/23 as expected for medication profile   CSA 9/20/23  PSA 12/13/22  Colonoscopy 10/28/22 Clear and due in 2027.. Polyp in 2021.     Review of Systems    Objective   /78 (BP Location: Left arm, Patient Position: Sitting)   Pulse 72   Temp 36.2 °C (97.2 °F)   Wt 58.2 kg (128 lb 3.2 oz)   SpO2 95%   BMI 20.23 kg/m²     Physical Exam  Constitutional:       General: He is not in acute distress.     Appearance: Normal appearance.   HENT:      Head: Normocephalic.      Right Ear: Tympanic membrane normal.      Left Ear: Tympanic membrane normal.      Nose: Nose normal.      Mouth/Throat:      Pharynx: Oropharynx is clear.   Eyes:      Extraocular Movements: Extraocular movements intact.      Conjunctiva/sclera: Conjunctivae normal.      Pupils: Pupils are equal, round, and reactive to light.   Neck:      Vascular: No carotid bruit.   Cardiovascular:      Rate and Rhythm: Normal rate and regular rhythm.      Pulses: Normal pulses.      Heart sounds: Normal heart sounds. No murmur heard.  Pulmonary:      Effort: Pulmonary effort is normal. No  respiratory distress.      Breath sounds: Normal breath sounds.   Abdominal:      General: Abdomen is flat. Bowel sounds are normal. There is no distension.      Palpations: Abdomen is soft. There is mass (hard as before in LLQ.).      Tenderness: There is no abdominal tenderness.   Musculoskeletal:      Cervical back: Normal range of motion and neck supple. No tenderness.      Comments: The prominence of the left hip is gone.    Lymphadenopathy:      Cervical: No cervical adenopathy.   Skin:     General: Skin is warm and dry.      Findings: No rash.   Neurological:      General: No focal deficit present.      Mental Status: He is alert and oriented to person, place, and time.   Psychiatric:         Mood and Affect: Mood normal.         Thought Content: Thought content normal.         Judgment: Judgment normal.     Assessment/Plan   Diagnoses and all orders for this visit:  Actinic keratosis  Neuroforaminal stenosis of lumbar spine  -     Follow Up In Primary Care - Established  -     Follow Up In Primary Care - Established; Future  Other chronic pain  -     HYDROcodone-acetaminophen (Norco) 7.5-325 mg tablet; Take 1 tablet by mouth 4 times a day.  Cardiomyopathy due to hypertension, without heart failure (CMS/HCC)  Chronic anemia  Depression, major, single episode, moderate (CMS/HCC)  Gastroesophageal reflux disease without esophagitis  Hiatal hernia  History of right hip replacement  History of kidney transplant  Mixed hyperlipidemia  Primary hypertension  Mass of joint of left hip  Polyp of colon, unspecified part of colon, unspecified type  Rupture of left proximal biceps tendon, subsequent encounter  Situational anxiety  Stage 3a chronic kidney disease (CMS/HCC)  Subclinical hypothyroidism  Aneurysm of ascending aorta without rupture (CMS/HCC)  Vitamin D deficiency

## 2024-01-03 ENCOUNTER — TELEPHONE (OUTPATIENT)
Dept: PRIMARY CARE | Facility: CLINIC | Age: 69
End: 2024-01-03
Payer: MEDICARE

## 2024-01-03 DIAGNOSIS — N18.31 STAGE 3A CHRONIC KIDNEY DISEASE (MULTI): ICD-10-CM

## 2024-01-03 RX ORDER — AZATHIOPRINE 50 MG/1
25 TABLET ORAL EVERY OTHER DAY
Qty: 30 TABLET | Refills: 1 | Status: SHIPPED | OUTPATIENT
Start: 2024-01-03

## 2024-01-05 DIAGNOSIS — N18.31 STAGE 3A CHRONIC KIDNEY DISEASE (MULTI): ICD-10-CM

## 2024-01-09 ENCOUNTER — LAB (OUTPATIENT)
Dept: LAB | Facility: LAB | Age: 69
End: 2024-01-09
Payer: MEDICARE

## 2024-01-09 DIAGNOSIS — N18.31 STAGE 3A CHRONIC KIDNEY DISEASE (MULTI): ICD-10-CM

## 2024-01-09 LAB
ANION GAP SERPL CALC-SCNC: 12 MMOL/L (ref 10–20)
APPEARANCE UR: CLEAR
BILIRUB UR STRIP.AUTO-MCNC: NEGATIVE MG/DL
BUN SERPL-MCNC: 11 MG/DL (ref 6–23)
CALCIUM SERPL-MCNC: 9.1 MG/DL (ref 8.6–10.3)
CHLORIDE SERPL-SCNC: 107 MMOL/L (ref 98–107)
CO2 SERPL-SCNC: 27 MMOL/L (ref 21–32)
COLOR UR: YELLOW
CREAT SERPL-MCNC: 1.53 MG/DL (ref 0.5–1.3)
CREAT UR-MCNC: 170.2 MG/DL (ref 20–370)
EGFRCR SERPLBLD CKD-EPI 2021: 50 ML/MIN/1.73M*2
GLUCOSE SERPL-MCNC: 108 MG/DL (ref 74–99)
GLUCOSE UR STRIP.AUTO-MCNC: NEGATIVE MG/DL
KETONES UR STRIP.AUTO-MCNC: NEGATIVE MG/DL
LEUKOCYTE ESTERASE UR QL STRIP.AUTO: NEGATIVE
MICROALBUMIN UR-MCNC: 12 MG/L
MICROALBUMIN/CREAT UR: 7.1 UG/MG CREAT
NITRITE UR QL STRIP.AUTO: NEGATIVE
PH UR STRIP.AUTO: 5 [PH]
POTASSIUM SERPL-SCNC: 4.1 MMOL/L (ref 3.5–5.3)
PROT UR STRIP.AUTO-MCNC: NEGATIVE MG/DL
RBC # UR STRIP.AUTO: NEGATIVE /UL
SODIUM SERPL-SCNC: 142 MMOL/L (ref 136–145)
SP GR UR STRIP.AUTO: 1.02
URATE SERPL-MCNC: 4 MG/DL (ref 4–7.5)
UROBILINOGEN UR STRIP.AUTO-MCNC: <2 MG/DL

## 2024-01-09 PROCEDURE — 36415 COLL VENOUS BLD VENIPUNCTURE: CPT

## 2024-01-09 PROCEDURE — 82570 ASSAY OF URINE CREATININE: CPT

## 2024-01-09 PROCEDURE — 82043 UR ALBUMIN QUANTITATIVE: CPT

## 2024-01-09 PROCEDURE — 81003 URINALYSIS AUTO W/O SCOPE: CPT

## 2024-01-09 PROCEDURE — 80048 BASIC METABOLIC PNL TOTAL CA: CPT

## 2024-01-09 PROCEDURE — 84550 ASSAY OF BLOOD/URIC ACID: CPT

## 2024-01-11 ENCOUNTER — OFFICE VISIT (OUTPATIENT)
Dept: NEPHROLOGY | Facility: CLINIC | Age: 69
End: 2024-01-11
Payer: MEDICARE

## 2024-01-11 VITALS
HEIGHT: 67 IN | DIASTOLIC BLOOD PRESSURE: 72 MMHG | SYSTOLIC BLOOD PRESSURE: 134 MMHG | HEART RATE: 112 BPM | WEIGHT: 131.4 LBS | BODY MASS INDEX: 20.62 KG/M2

## 2024-01-11 DIAGNOSIS — E55.9 VITAMIN D DEFICIENCY: ICD-10-CM

## 2024-01-11 DIAGNOSIS — Z94.0 HISTORY OF KIDNEY TRANSPLANT (HHS-HCC): ICD-10-CM

## 2024-01-11 DIAGNOSIS — N18.31 STAGE 3A CHRONIC KIDNEY DISEASE (MULTI): Primary | ICD-10-CM

## 2024-01-11 DIAGNOSIS — I10 PRIMARY HYPERTENSION: ICD-10-CM

## 2024-01-11 PROCEDURE — 1160F RVW MEDS BY RX/DR IN RCRD: CPT | Performed by: INTERNAL MEDICINE

## 2024-01-11 PROCEDURE — 1159F MED LIST DOCD IN RCRD: CPT | Performed by: INTERNAL MEDICINE

## 2024-01-11 PROCEDURE — 3078F DIAST BP <80 MM HG: CPT | Performed by: INTERNAL MEDICINE

## 2024-01-11 PROCEDURE — 1036F TOBACCO NON-USER: CPT | Performed by: INTERNAL MEDICINE

## 2024-01-11 PROCEDURE — 99213 OFFICE O/P EST LOW 20 MIN: CPT | Performed by: INTERNAL MEDICINE

## 2024-01-11 PROCEDURE — 1125F AMNT PAIN NOTED PAIN PRSNT: CPT | Performed by: INTERNAL MEDICINE

## 2024-01-11 PROCEDURE — 3075F SYST BP GE 130 - 139MM HG: CPT | Performed by: INTERNAL MEDICINE

## 2024-01-11 ASSESSMENT — ENCOUNTER SYMPTOMS
CARDIOVASCULAR NEGATIVE: 1
ENDOCRINE NEGATIVE: 1
CONSTITUTIONAL NEGATIVE: 1
RESPIRATORY NEGATIVE: 1
NEUROLOGICAL NEGATIVE: 1
HEMATOLOGIC/LYMPHATIC NEGATIVE: 1
GASTROINTESTINAL NEGATIVE: 1
ALLERGIC/IMMUNOLOGIC NEGATIVE: 1
PSYCHIATRIC NEGATIVE: 1
EYES NEGATIVE: 1
MUSCULOSKELETAL NEGATIVE: 1

## 2024-01-11 NOTE — PROGRESS NOTES
Subjective   He is feelingwell  Has no complaints    Patient ID: Shahid Us is a 68 y.o. male who presents for Follow-up (6 month ck/Review labs ).  HPI  He is here for follow-up with history of a renal transplant with underlying chronic kidney disease.  Blood work was drawn on January the night  His metabolic panel shows pretty normal-looking electrolytes BUN is 11 with a creatinine of 1.53 with an estimated GFR of 50  Uric acid 4.0  No albumin in his urine  Urinalysis looks quite bland  Medications are reviewed he is on allopurinol aspirin azathioprine carvedilol vitamin D famotidine finasteride Imdur losartan a PPI a statin and Flomax  Blood pressure here in the office today is 134/72  Review of Systems   Constitutional: Negative.    HENT: Negative.     Eyes: Negative.    Respiratory: Negative.     Cardiovascular: Negative.    Gastrointestinal: Negative.    Endocrine: Negative.    Genitourinary: Negative.    Musculoskeletal: Negative.    Skin: Negative.    Allergic/Immunologic: Negative.    Neurological: Negative.    Hematological: Negative.    Psychiatric/Behavioral: Negative.         Objective   Physical Exam  Constitutional:       Appearance: Normal appearance.   HENT:      Head: Normocephalic and atraumatic.      Right Ear: External ear normal.      Left Ear: External ear normal.      Nose: Nose normal.      Mouth/Throat:      Mouth: Mucous membranes are moist.      Pharynx: Oropharynx is clear.      Comments: Scab on bottom lip left side.    Eyes:      Extraocular Movements: Extraocular movements intact.      Conjunctiva/sclera: Conjunctivae normal.      Pupils: Pupils are equal, round, and reactive to light.   Cardiovascular:      Rate and Rhythm: Normal rate and regular rhythm.   Pulmonary:      Effort: Pulmonary effort is normal.      Breath sounds: Normal breath sounds.   Abdominal:      General: Abdomen is flat.      Palpations: Abdomen is soft.   Skin:     General: Skin is warm and dry.    Neurological:      General: No focal deficit present.      Mental Status: He is alert and oriented to person, place, and time.   Psychiatric:         Mood and Affect: Mood normal.         Behavior: Behavior normal.         Assessment/Plan   Problem List Items Addressed This Visit             ICD-10-CM    Stage 3 chronic kidney disease (CMS/HCC) - Primary N18.30     Renal Function is stable  Back at his baseline of 1.5-1.7  Doing awesome with renal transplant.           Relevant Orders    Follow Up In Nephrology    Basic metabolic panel    Vitamin D deficiency E55.9    History of kidney transplant Z94.0    Hypertension I10     Gp good at this time.               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

## 2024-01-25 ENCOUNTER — TELEPHONE (OUTPATIENT)
Dept: PRIMARY CARE | Facility: CLINIC | Age: 69
End: 2024-01-25
Payer: MEDICARE

## 2024-01-25 DIAGNOSIS — G89.29 OTHER CHRONIC PAIN: ICD-10-CM

## 2024-01-25 RX ORDER — HYDROCODONE BITARTRATE AND ACETAMINOPHEN 7.5; 325 MG/1; MG/1
1 TABLET ORAL 4 TIMES DAILY
Qty: 120 TABLET | Refills: 0 | Status: SHIPPED | OUTPATIENT
Start: 2024-01-25 | End: 2024-02-22 | Stop reason: SDUPTHER

## 2024-01-25 NOTE — TELEPHONE ENCOUNTER
01/25/24 Patient is requesting a refill for Hydrocodone-acetaminpheen 7.5-325 mg  He will run out this weekend

## 2024-02-22 ENCOUNTER — TELEPHONE (OUTPATIENT)
Dept: PRIMARY CARE | Facility: CLINIC | Age: 69
End: 2024-02-22
Payer: MEDICARE

## 2024-02-22 DIAGNOSIS — G89.29 OTHER CHRONIC PAIN: ICD-10-CM

## 2024-02-22 RX ORDER — HYDROCODONE BITARTRATE AND ACETAMINOPHEN 7.5; 325 MG/1; MG/1
1 TABLET ORAL 4 TIMES DAILY
Qty: 120 TABLET | Refills: 0 | Status: SHIPPED | OUTPATIENT
Start: 2024-02-22 | End: 2024-03-21 | Stop reason: SDUPTHER

## 2024-02-27 ENCOUNTER — HOSPITAL ENCOUNTER (OUTPATIENT)
Dept: RADIOLOGY | Facility: CLINIC | Age: 69
Discharge: HOME | End: 2024-02-27
Payer: MEDICARE

## 2024-02-27 ENCOUNTER — OFFICE VISIT (OUTPATIENT)
Dept: PRIMARY CARE | Facility: CLINIC | Age: 69
End: 2024-02-27
Payer: MEDICARE

## 2024-02-27 VITALS
SYSTOLIC BLOOD PRESSURE: 140 MMHG | OXYGEN SATURATION: 97 % | DIASTOLIC BLOOD PRESSURE: 82 MMHG | HEART RATE: 65 BPM | WEIGHT: 129 LBS | BODY MASS INDEX: 20.36 KG/M2

## 2024-02-27 DIAGNOSIS — M25.852 MASS OF JOINT OF LEFT HIP: ICD-10-CM

## 2024-02-27 DIAGNOSIS — N40.1 BENIGN PROSTATIC HYPERPLASIA WITH LOWER URINARY TRACT SYMPTOMS, SYMPTOM DETAILS UNSPECIFIED: ICD-10-CM

## 2024-02-27 DIAGNOSIS — S02.91XA: Primary | ICD-10-CM

## 2024-02-27 DIAGNOSIS — S06.33AA: Primary | ICD-10-CM

## 2024-02-27 DIAGNOSIS — R20.2 FACIAL PARESTHESIA: ICD-10-CM

## 2024-02-27 PROCEDURE — 3079F DIAST BP 80-89 MM HG: CPT | Performed by: FAMILY MEDICINE

## 2024-02-27 PROCEDURE — 3077F SYST BP >= 140 MM HG: CPT | Performed by: FAMILY MEDICINE

## 2024-02-27 PROCEDURE — 99214 OFFICE O/P EST MOD 30 MIN: CPT | Performed by: FAMILY MEDICINE

## 2024-02-27 PROCEDURE — 73502 X-RAY EXAM HIP UNI 2-3 VIEWS: CPT | Mod: LEFT SIDE | Performed by: RADIOLOGY

## 2024-02-27 PROCEDURE — 1159F MED LIST DOCD IN RCRD: CPT | Performed by: FAMILY MEDICINE

## 2024-02-27 PROCEDURE — 1160F RVW MEDS BY RX/DR IN RCRD: CPT | Performed by: FAMILY MEDICINE

## 2024-02-27 PROCEDURE — 73502 X-RAY EXAM HIP UNI 2-3 VIEWS: CPT | Mod: LT

## 2024-02-27 PROCEDURE — 1036F TOBACCO NON-USER: CPT | Performed by: FAMILY MEDICINE

## 2024-02-27 PROCEDURE — 1125F AMNT PAIN NOTED PAIN PRSNT: CPT | Performed by: FAMILY MEDICINE

## 2024-02-27 RX ORDER — TAMSULOSIN HYDROCHLORIDE 0.4 MG/1
0.4 CAPSULE ORAL NIGHTLY
Qty: 90 CAPSULE | Refills: 1 | Status: SHIPPED | OUTPATIENT
Start: 2024-02-27

## 2024-02-27 NOTE — PROGRESS NOTES
Subjective   Patient ID: Shahid Us is a 68 y.o. male who presents for Follow-up (Head still hurting from when he hit head last year).    HPI   Pain in the head from a bruise in the fall., Stood up and hit on metal piece of a wagon. Has bumped it a few times since then. No LOC. No laceration or bleeding. Did not feel lightheaded then. Face will get numb at times around the lips No new numbness of the hands and feet.   Has not had CT of the head. Tender to touch lightly. No deformity or depression. Cap feels odd on that area... Vision OK.  Speech is OK     Fell off skid  and landed on left side. The area of the left hip where he has a mass and was shrinking seems bigger again        Review of Systems    Objective   /82 (BP Location: Left arm, Patient Position: Sitting)   Pulse 65   Wt 58.5 kg (129 lb)   SpO2 97%   BMI 20.36 kg/m²     Physical Exam  Vitals reviewed.   Constitutional:       General: He is not in acute distress.     Appearance: Normal appearance.   HENT:      Head: Normocephalic.      Comments: Tender across anterior suture but not deformity or swelling.     Right Ear: Tympanic membrane, ear canal and external ear normal.      Left Ear: Tympanic membrane, ear canal and external ear normal.      Nose: Nose normal.      Mouth/Throat:      Pharynx: Oropharynx is clear.   Eyes:      Extraocular Movements: Extraocular movements intact.      Conjunctiva/sclera: Conjunctivae normal.      Pupils: Pupils are equal, round, and reactive to light.   Neck:      Vascular: No carotid bruit.   Cardiovascular:      Rate and Rhythm: Normal rate and regular rhythm.      Pulses: Normal pulses.      Heart sounds: Normal heart sounds. No murmur heard.  Pulmonary:      Effort: Pulmonary effort is normal. No respiratory distress.      Breath sounds: Normal breath sounds.   Abdominal:      General: Abdomen is flat. Bowel sounds are normal. There is no distension.      Palpations: Abdomen is soft. There is  no mass.      Tenderness: There is no abdominal tenderness.   Musculoskeletal:         General: No deformity (that mass in the left hip is not felt today).      Cervical back: Normal range of motion and neck supple. No tenderness.   Lymphadenopathy:      Cervical: No cervical adenopathy.   Skin:     General: Skin is warm and dry.      Findings: Lesion (SK at occiput. I peeled off waxy material) present. No bruising or rash.   Neurological:      General: No focal deficit present.      Mental Status: He is alert and oriented to person, place, and time.      Cranial Nerves: No cranial nerve deficit.      Sensory: No sensory deficit.      Motor: No weakness.      Deep Tendon Reflexes: Reflexes normal.   Psychiatric:         Mood and Affect: Mood normal.         Thought Content: Thought content normal.         Judgment: Judgment normal.         Assessment/Plan   Diagnoses and all orders for this visit:  Closed skull fracture with cerebral contusion, initial encounter (CMS/[facility])  -     CT head wo IV contrast; Future  Benign prostatic hyperplasia with lower urinary tract symptoms, symptom details unspecified  -     tamsulosin (Flomax) 0.4 mg 24 hr capsule; Take 1 capsule (0.4 mg) by mouth once daily at bedtime.  Facial paresthesia  -     CT head wo IV contrast; Future  Mass of joint of left hip  -     XR hip left with pelvis when performed 2 or 3 views; Future

## 2024-02-29 NOTE — RESULT ENCOUNTER NOTE
The hip Xray does not show any new bone growth. It does show signs that the hip replacement is wearing out. So if he is having a lot of pain there, I can refer to orthopedics.

## 2024-03-02 ENCOUNTER — HOSPITAL ENCOUNTER (OUTPATIENT)
Dept: RADIOLOGY | Facility: HOSPITAL | Age: 69
Discharge: HOME | End: 2024-03-02
Payer: MEDICARE

## 2024-03-02 DIAGNOSIS — S02.91XA: ICD-10-CM

## 2024-03-02 DIAGNOSIS — S06.33AA: ICD-10-CM

## 2024-03-02 DIAGNOSIS — R20.2 FACIAL PARESTHESIA: ICD-10-CM

## 2024-03-02 PROCEDURE — 70450 CT HEAD/BRAIN W/O DYE: CPT | Performed by: RADIOLOGY

## 2024-03-02 PROCEDURE — 70450 CT HEAD/BRAIN W/O DYE: CPT

## 2024-03-03 ENCOUNTER — TELEPHONE (OUTPATIENT)
Dept: PRIMARY CARE | Facility: CLINIC | Age: 69
End: 2024-03-03
Payer: MEDICARE

## 2024-03-03 DIAGNOSIS — F32.1 DEPRESSION, MAJOR, SINGLE EPISODE, MODERATE (MULTI): ICD-10-CM

## 2024-03-03 DIAGNOSIS — I10 PRIMARY HYPERTENSION: ICD-10-CM

## 2024-03-04 RX ORDER — LOSARTAN POTASSIUM 50 MG/1
50 TABLET ORAL DAILY
Qty: 90 TABLET | Refills: 1 | OUTPATIENT
Start: 2024-03-04

## 2024-03-04 RX ORDER — BUPROPION HYDROCHLORIDE 150 MG/1
150 TABLET, EXTENDED RELEASE ORAL 2 TIMES DAILY
Qty: 180 TABLET | Refills: 1 | OUTPATIENT
Start: 2024-03-04

## 2024-03-12 RX ORDER — LOSARTAN POTASSIUM 50 MG/1
50 TABLET ORAL DAILY
Qty: 10 TABLET | Refills: 0 | Status: SHIPPED | OUTPATIENT
Start: 2024-03-12 | End: 2024-03-20 | Stop reason: SDUPTHER

## 2024-03-16 ENCOUNTER — TELEPHONE (OUTPATIENT)
Dept: PRIMARY CARE | Facility: CLINIC | Age: 69
End: 2024-03-16
Payer: MEDICARE

## 2024-03-16 DIAGNOSIS — G89.29 OTHER CHRONIC PAIN: ICD-10-CM

## 2024-03-16 DIAGNOSIS — K21.9 GASTROESOPHAGEAL REFLUX DISEASE, UNSPECIFIED WHETHER ESOPHAGITIS PRESENT: ICD-10-CM

## 2024-03-18 RX ORDER — OMEPRAZOLE 40 MG/1
40 CAPSULE, DELAYED RELEASE ORAL
Qty: 90 CAPSULE | Refills: 3 | OUTPATIENT
Start: 2024-03-18 | End: 2025-03-18

## 2024-03-20 ENCOUNTER — HOSPITAL ENCOUNTER (OUTPATIENT)
Dept: RADIOLOGY | Facility: CLINIC | Age: 69
Discharge: HOME | End: 2024-03-20
Payer: MEDICARE

## 2024-03-20 ENCOUNTER — OFFICE VISIT (OUTPATIENT)
Dept: PRIMARY CARE | Facility: CLINIC | Age: 69
End: 2024-03-20
Payer: MEDICARE

## 2024-03-20 VITALS
SYSTOLIC BLOOD PRESSURE: 142 MMHG | DIASTOLIC BLOOD PRESSURE: 94 MMHG | BODY MASS INDEX: 21.3 KG/M2 | WEIGHT: 135 LBS | OXYGEN SATURATION: 96 % | HEART RATE: 73 BPM

## 2024-03-20 DIAGNOSIS — R19.00 PELVIC MASS IN MALE: ICD-10-CM

## 2024-03-20 DIAGNOSIS — I43 CARDIOMYOPATHY DUE TO HYPERTENSION, WITHOUT HEART FAILURE (MULTI): ICD-10-CM

## 2024-03-20 DIAGNOSIS — Z00.00 HEALTHCARE MAINTENANCE: ICD-10-CM

## 2024-03-20 DIAGNOSIS — K21.9 GASTROESOPHAGEAL REFLUX DISEASE WITHOUT ESOPHAGITIS: ICD-10-CM

## 2024-03-20 DIAGNOSIS — I71.21 ANEURYSM OF ASCENDING AORTA WITHOUT RUPTURE (CMS-HCC): ICD-10-CM

## 2024-03-20 DIAGNOSIS — K63.5 POLYP OF COLON, UNSPECIFIED PART OF COLON, UNSPECIFIED TYPE: ICD-10-CM

## 2024-03-20 DIAGNOSIS — M25.852 MASS OF JOINT OF LEFT HIP: ICD-10-CM

## 2024-03-20 DIAGNOSIS — E78.2 MIXED HYPERLIPIDEMIA: ICD-10-CM

## 2024-03-20 DIAGNOSIS — C44.90 SKIN CANCER: ICD-10-CM

## 2024-03-20 DIAGNOSIS — F32.1 DEPRESSION, MAJOR, SINGLE EPISODE, MODERATE (MULTI): ICD-10-CM

## 2024-03-20 DIAGNOSIS — N18.31 STAGE 3A CHRONIC KIDNEY DISEASE (MULTI): ICD-10-CM

## 2024-03-20 DIAGNOSIS — Z00.00 ROUTINE GENERAL MEDICAL EXAMINATION AT HEALTH CARE FACILITY: Primary | ICD-10-CM

## 2024-03-20 DIAGNOSIS — I11.9 CARDIOMYOPATHY DUE TO HYPERTENSION, WITHOUT HEART FAILURE (MULTI): ICD-10-CM

## 2024-03-20 DIAGNOSIS — D64.9 CHRONIC ANEMIA: ICD-10-CM

## 2024-03-20 DIAGNOSIS — Z94.0 HISTORY OF KIDNEY TRANSPLANT (HHS-HCC): ICD-10-CM

## 2024-03-20 DIAGNOSIS — M48.061 NEUROFORAMINAL STENOSIS OF LUMBAR SPINE: ICD-10-CM

## 2024-03-20 DIAGNOSIS — K44.9 HIATAL HERNIA: ICD-10-CM

## 2024-03-20 DIAGNOSIS — E55.9 VITAMIN D DEFICIENCY: ICD-10-CM

## 2024-03-20 DIAGNOSIS — L57.0 ACTINIC KERATOSIS: ICD-10-CM

## 2024-03-20 DIAGNOSIS — I10 PRIMARY HYPERTENSION: ICD-10-CM

## 2024-03-20 DIAGNOSIS — E03.8 SUBCLINICAL HYPOTHYROIDISM: ICD-10-CM

## 2024-03-20 DIAGNOSIS — F41.8 SITUATIONAL ANXIETY: ICD-10-CM

## 2024-03-20 DIAGNOSIS — Z96.641 HISTORY OF RIGHT HIP REPLACEMENT: ICD-10-CM

## 2024-03-20 PROCEDURE — 1036F TOBACCO NON-USER: CPT | Performed by: FAMILY MEDICINE

## 2024-03-20 PROCEDURE — 3077F SYST BP >= 140 MM HG: CPT | Performed by: FAMILY MEDICINE

## 2024-03-20 PROCEDURE — 1160F RVW MEDS BY RX/DR IN RCRD: CPT | Performed by: FAMILY MEDICINE

## 2024-03-20 PROCEDURE — G0439 PPPS, SUBSEQ VISIT: HCPCS | Performed by: FAMILY MEDICINE

## 2024-03-20 PROCEDURE — 1123F ACP DISCUSS/DSCN MKR DOCD: CPT | Performed by: FAMILY MEDICINE

## 2024-03-20 PROCEDURE — 1170F FXNL STATUS ASSESSED: CPT | Performed by: FAMILY MEDICINE

## 2024-03-20 PROCEDURE — 99214 OFFICE O/P EST MOD 30 MIN: CPT | Performed by: FAMILY MEDICINE

## 2024-03-20 PROCEDURE — 71046 X-RAY EXAM CHEST 2 VIEWS: CPT

## 2024-03-20 PROCEDURE — 1158F ADVNC CARE PLAN TLK DOCD: CPT | Performed by: FAMILY MEDICINE

## 2024-03-20 PROCEDURE — 3080F DIAST BP >= 90 MM HG: CPT | Performed by: FAMILY MEDICINE

## 2024-03-20 PROCEDURE — 71046 X-RAY EXAM CHEST 2 VIEWS: CPT | Performed by: RADIOLOGY

## 2024-03-20 PROCEDURE — 1159F MED LIST DOCD IN RCRD: CPT | Performed by: FAMILY MEDICINE

## 2024-03-20 RX ORDER — LOSARTAN POTASSIUM 50 MG/1
50 TABLET ORAL DAILY
Qty: 90 TABLET | Refills: 1 | Status: SHIPPED | OUTPATIENT
Start: 2024-03-20 | End: 2024-04-16 | Stop reason: SDUPTHER

## 2024-03-20 ASSESSMENT — PATIENT HEALTH QUESTIONNAIRE - PHQ9
1. LITTLE INTEREST OR PLEASURE IN DOING THINGS: NOT AT ALL
SUM OF ALL RESPONSES TO PHQ9 QUESTIONS 1 AND 2: 0
1. LITTLE INTEREST OR PLEASURE IN DOING THINGS: NOT AT ALL
2. FEELING DOWN, DEPRESSED OR HOPELESS: NOT AT ALL
2. FEELING DOWN, DEPRESSED OR HOPELESS: NOT AT ALL
SUM OF ALL RESPONSES TO PHQ9 QUESTIONS 1 AND 2: 0

## 2024-03-20 ASSESSMENT — ACTIVITIES OF DAILY LIVING (ADL)
DOING_HOUSEWORK: INDEPENDENT
TAKING_MEDICATION: INDEPENDENT
MANAGING_FINANCES: INDEPENDENT
BATHING: INDEPENDENT
GROCERY_SHOPPING: INDEPENDENT
DRESSING: INDEPENDENT

## 2024-03-20 NOTE — PROGRESS NOTES
Subjective   Reason for Visit: Shahid Us is an 68 y.o. male here for a Medicare Wellness visit.     Past Medical, Surgical, and Family History reviewed and updated in chart.    Reviewed all medications by prescribing practitioner or clinical pharmacist (such as prescriptions, OTCs, herbal therapies and supplements) and documented in the medical record.    HPI  Still sore on top of head where he hit it last month. Better. CT unremarkable     Hip pain and arthritis in back. 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 At this point just observing.  Bilateral hip replacement. Left unrepaired biceps tendon rupture. Dr Joseph as needed.  Now pain is back in left hip. Still in pain from 3-8. Switched to oxycodone due to lack of availability of Norco.  Felt better on Norco.  Drops about 5. Will last about 5 hours.  Can maintain most normal activity with the meds. The enlargement on the leg seemed to be a little worse last month but Xray unchanged      Cardiomyopathy - EF in October 2022 45-50% global hypokinesis. No swelling or orthopnea. Does feel he needs to take a deep breath at times.      Had colonoscopy for polyp and subsequent bleeding in abdomen. That has resolved on last CT.   Had hemoglobin down to 7.8 then, in June 12.1. Colonoscopy 10/2022      Hypertension and hyperlipidemia - Blood pressure has been 130s/80s.   No side effects. No Chest pain, palpitations, numbness, weakness, edema, claudication, or double vision/ loss of vision. Dr Perez changed him from amlodipine to Losartan. No swelling with losartan. And back on simvastatin with good Lipid last  time.       Depression and situation anxiety is doing well on current medications. Had been losing weight at about 20 pounds over last 1 1/2 years. Now up 6 to BMI 21.       Gout has not been an issue on allopurinol.  UA 4.0 in January      CKD 3a and transplant 1979 - GFR 50.   No blood. Mild anemia. Azathioprine. Dr Perez follows.       Subclinical Hypothyroid - was 8.16 this time.  Free T4 0.79.  Reports cold intolerance, fatigue, joint aches, not on levothyroxine/synthroid. Started it but made legs achy.      Vitamin D deficiency good on supplement weekly. 52 in June      GERD and HH - No HB, Melena, dysphagia, or hematochezia. On Omeprazole and famotidine.  Has diffuse abdomen pain.. Comes and goes. BM OK. No change with food     Chronic anemia - HGB 12.1 last June      AKs on nose and ears and hands - Dr Casanova cut one on hand, right arm and neck. The one on hand SCC.  In January removed AK from lip .      TAA 4.2 on 10/2022. Saw Dr Mckeon lst fall . . Can go every 1-2 years since stable      Ruptured biceps tendon - did see NP with Dr Joseph's office and was given option to repair or just leave it. It is feeling better. No change in strength. Still able to carry bucket.     Spinal stenosis - keeps awake and pain meds help that.       Testicles hurt off and on. No mass     UDS 8/6/21 as expected for medication profile 9/12/22 as expected for medication profile 9/20/23 as expected for medication profile   CSA 9/20/23  PSA 12/13/22  Colonoscopy 10/28/22 Clear and due in 2027. Polyp in 2021.   LW and DPA yes wife   Pneumovax UTD     Patient Care Team:  Warren Talley MD as PCP - General  Warren Talley MD as PCP - [employer] PCP     Review of Systems    Objective   Vitals:  BP (!) 142/94 (BP Location: Left arm, Patient Position: Sitting)   Pulse 73   Wt 61.2 kg (135 lb)   SpO2 96%   BMI 21.30 kg/m²       Physical Exam  Vitals reviewed.   Constitutional:       General: He is not in acute distress.     Appearance: Normal appearance. He is normal weight.   HENT:      Head: Normocephalic.      Right Ear: Tympanic membrane, ear canal and external ear normal.      Left Ear: Tympanic membrane, ear canal and external ear normal.      Nose: Nose normal.      Mouth/Throat:      Mouth: Mucous membranes are moist.      Pharynx: Oropharynx  is clear.   Eyes:      Extraocular Movements: Extraocular movements intact.      Conjunctiva/sclera: Conjunctivae normal.      Pupils: Pupils are equal, round, and reactive to light.   Neck:      Vascular: No carotid bruit.   Cardiovascular:      Rate and Rhythm: Normal rate and regular rhythm.      Pulses: Normal pulses.      Heart sounds: Normal heart sounds. No murmur heard.  Pulmonary:      Effort: Pulmonary effort is normal. No respiratory distress.      Breath sounds: Normal breath sounds.   Abdominal:      General: Abdomen is flat. Bowel sounds are normal. There is no distension.      Palpations: Abdomen is soft. There is mass (has firm mass in LLQ.).      Tenderness: There is no abdominal tenderness.   Musculoskeletal:         General: Tenderness (low back) present.      Cervical back: Normal range of motion and neck supple. No tenderness.   Lymphadenopathy:      Cervical: No cervical adenopathy.   Skin:     General: Skin is warm and dry.      Findings: No rash.   Neurological:      General: No focal deficit present.      Mental Status: He is alert and oriented to person, place, and time.   Psychiatric:         Mood and Affect: Mood normal.         Thought Content: Thought content normal.         Judgment: Judgment normal.         Assessment/Plan   Problem List Items Addressed This Visit       Actinic keratosis    Cardiomyopathy due to hypertension (CMS/HCC) - Primary    Overview        Relevant Orders    XR chest 2 views    Comprehensive Metabolic Panel    Chronic anemia    Relevant Orders    CBC and Auto Differential    Depression, major, single episode, moderate (CMS/HCC)    Gastroesophageal reflux disease    Hiatal hernia    Overview     Large per CT chest May 2020         History of joint replacement    History of kidney transplant            Hyperlipidemia    Hypertension            Relevant Medications    losartan (Cozaar) 50 mg tablet    Other Relevant Orders    Follow Up In Primary Care -  Established    Mass of joint of left hip    Neuroforaminal stenosis of lumbar spine    Pelvic mass in male    Polyp of colon    Situational anxiety    Skin cancer    Stage 3 chronic kidney disease (CMS/HCC)    Subclinical hypothyroidism    Relevant Orders    TSH with reflex to Free T4 if abnormal    Thoracic aortic aneurysm without rupture (CMS/HCC)    Vitamin D deficiency    Relevant Orders    Vitamin D 25-Hydroxy,Total (for eval of Vitamin D levels)     Other Visit Diagnoses       Healthcare maintenance        Relevant Orders    Prostate Specific Antigen, Screen

## 2024-03-21 RX ORDER — HYDROCODONE BITARTRATE AND ACETAMINOPHEN 7.5; 325 MG/1; MG/1
1 TABLET ORAL 4 TIMES DAILY
Qty: 120 TABLET | Refills: 0 | Status: SHIPPED | OUTPATIENT
Start: 2024-03-21 | End: 2024-04-24 | Stop reason: SDUPTHER

## 2024-03-21 NOTE — TELEPHONE ENCOUNTER
03/21/24 Patient needs refill called in for     Hydrocodone-acetaminophen (norco) 7.5-325 mg tablet    [facility] [place]

## 2024-03-22 NOTE — RESULT ENCOUNTER NOTE
Let him know the Hiatal Hernia is still there but not any different than it has been.  Same amount in the chest

## 2024-03-29 ENCOUNTER — TELEPHONE (OUTPATIENT)
Dept: PRIMARY CARE | Facility: CLINIC | Age: 69
End: 2024-03-29
Payer: MEDICARE

## 2024-03-29 DIAGNOSIS — F32.1 DEPRESSION, MAJOR, SINGLE EPISODE, MODERATE (MULTI): ICD-10-CM

## 2024-03-29 RX ORDER — BUPROPION HYDROCHLORIDE 150 MG/1
150 TABLET, EXTENDED RELEASE ORAL 2 TIMES DAILY
Qty: 180 TABLET | Refills: 1 | Status: SHIPPED | OUTPATIENT
Start: 2024-03-29

## 2024-03-29 NOTE — TELEPHONE ENCOUNTER
buPROPion SR (Wellbutrin SR) 150 mg 12 hr tablet [[id number]]    Order Details  Dose: 150 mg Route: oral Frequency: 2 times daily   Dispense Quantity: 180 tablet Refills: 1          Sig: Take 1 tablet (150 mg) by mouth 2 times a day.         Start Date: 09/08/23 End Date: --   Written Date: 09/08/23 Rx Expiration Date: 09/07/24        Associated Diagnoses: Depression, major, single episode, moderate (CMS/HCC) [F32.1]   Original Order: buPROPion SR (Wellbutrin SR) 150 mg 12 hr tablet [[id number]]   [facility]. Thank you.

## 2024-03-30 ENCOUNTER — APPOINTMENT (OUTPATIENT)
Dept: RADIOLOGY | Facility: HOSPITAL | Age: 69
End: 2024-03-30
Payer: MEDICARE

## 2024-04-12 ENCOUNTER — TELEPHONE (OUTPATIENT)
Dept: PRIMARY CARE | Facility: CLINIC | Age: 69
End: 2024-04-12
Payer: MEDICARE

## 2024-04-12 NOTE — TELEPHONE ENCOUNTER
BP is 186/106 sometimes 176/96. Getting kind of SOB. Has an appointment Tuesday. Asking if that's ok to wait that long. Asking for a call back.     Sounds good

## 2024-04-16 ENCOUNTER — OFFICE VISIT (OUTPATIENT)
Dept: PRIMARY CARE | Facility: CLINIC | Age: 69
End: 2024-04-16
Payer: MEDICARE

## 2024-04-16 VITALS
HEART RATE: 72 BPM | WEIGHT: 132 LBS | BODY MASS INDEX: 20.83 KG/M2 | OXYGEN SATURATION: 96 % | DIASTOLIC BLOOD PRESSURE: 82 MMHG | SYSTOLIC BLOOD PRESSURE: 148 MMHG

## 2024-04-16 DIAGNOSIS — M65.341 TRIGGER RING FINGER OF RIGHT HAND: ICD-10-CM

## 2024-04-16 DIAGNOSIS — B07.0 PLANTAR WART: Primary | ICD-10-CM

## 2024-04-16 DIAGNOSIS — K21.9 GASTROESOPHAGEAL REFLUX DISEASE, UNSPECIFIED WHETHER ESOPHAGITIS PRESENT: ICD-10-CM

## 2024-04-16 DIAGNOSIS — I10 PRIMARY HYPERTENSION: ICD-10-CM

## 2024-04-16 PROCEDURE — 1160F RVW MEDS BY RX/DR IN RCRD: CPT | Performed by: FAMILY MEDICINE

## 2024-04-16 PROCEDURE — 3077F SYST BP >= 140 MM HG: CPT | Performed by: FAMILY MEDICINE

## 2024-04-16 PROCEDURE — 3079F DIAST BP 80-89 MM HG: CPT | Performed by: FAMILY MEDICINE

## 2024-04-16 PROCEDURE — 1159F MED LIST DOCD IN RCRD: CPT | Performed by: FAMILY MEDICINE

## 2024-04-16 PROCEDURE — 1036F TOBACCO NON-USER: CPT | Performed by: FAMILY MEDICINE

## 2024-04-16 PROCEDURE — 99214 OFFICE O/P EST MOD 30 MIN: CPT | Performed by: FAMILY MEDICINE

## 2024-04-16 RX ORDER — OMEPRAZOLE 40 MG/1
40 CAPSULE, DELAYED RELEASE ORAL
Qty: 30 CAPSULE | Refills: 11 | Status: SHIPPED | OUTPATIENT
Start: 2024-04-16 | End: 2025-04-16

## 2024-04-16 RX ORDER — LOSARTAN POTASSIUM 100 MG/1
100 TABLET ORAL DAILY
Qty: 90 TABLET | Refills: 3 | Status: SHIPPED | OUTPATIENT
Start: 2024-04-16 | End: 2025-04-16

## 2024-04-16 NOTE — PROGRESS NOTES
Subjective   Patient ID: Shahid Us is a 68 y.o. male who presents for evaluate (Elevated blood pressure).    HPI   Has been having elevated BP at home. 160s/90s at home. For the last month or or so. No new meds. Head still hurts since he hit it a couple months ago. Had a CT with no SDH.  No stroke symptoms. Vision is OK.. No Chest pain, palpitations, edema, numbness, weakness, claudications, or double vision/ loss of vision. MADRIGAL for a long time. GFR in January was 50 and stable     Has a trigger finger on right hand and missing another finger from amputation traumatic.      Also has a callus on his left foot that he had has scraped and found helpful     Gets pain in the testicles with light touch but not generally achy otherwise. Also achy in abdomen at scar site. Does not affect eating or bowels. No HB, dysphagia, melena or hematochezia.. Exam in the past showed tender testicles to exam.       Review of Systems    Objective   /82 (BP Location: Right arm, Patient Position: Sitting)   Pulse 72   Wt 59.9 kg (132 lb)   SpO2 96%   BMI 20.83 kg/m²     Physical Exam  Vitals reviewed.   Constitutional:       Appearance: Normal appearance.   HENT:      Head: Normocephalic.   Eyes:      Extraocular Movements: Extraocular movements intact.      Conjunctiva/sclera: Conjunctivae normal.      Pupils: Pupils are equal, round, and reactive to light.   Neck:      Vascular: No carotid bruit.   Cardiovascular:      Rate and Rhythm: Normal rate and regular rhythm.      Heart sounds: No murmur heard.  Pulmonary:      Effort: Pulmonary effort is normal.      Breath sounds: Normal breath sounds.   Abdominal:      General: Abdomen is flat. Bowel sounds are normal.      Palpations: Abdomen is soft.   Musculoskeletal:         General: Tenderness (palm of right had ant 4th MP. Trigger at that site.) present.      Cervical back: Normal range of motion and neck supple. No tenderness.   Lymphadenopathy:      Cervical: No  cervical adenopathy.   Skin:     General: Skin is warm and dry.      Findings: Lesion (callus/ wart at 2nd MP left foot shaved off today with good relief of symptoms) present.   Neurological:      Mental Status: He is alert and oriented to person, place, and time.   Psychiatric:         Mood and Affect: Mood normal.         Behavior: Behavior normal.         Thought Content: Thought content normal.         Judgment: Judgment normal.         Assessment/Plan

## 2024-04-24 ENCOUNTER — TELEPHONE (OUTPATIENT)
Dept: PRIMARY CARE | Facility: CLINIC | Age: 69
End: 2024-04-24
Payer: MEDICARE

## 2024-04-24 DIAGNOSIS — G89.29 OTHER CHRONIC PAIN: ICD-10-CM

## 2024-04-24 RX ORDER — HYDROCODONE BITARTRATE AND ACETAMINOPHEN 7.5; 325 MG/1; MG/1
1 TABLET ORAL 4 TIMES DAILY
Qty: 120 TABLET | Refills: 0 | Status: SHIPPED | OUTPATIENT
Start: 2024-04-24 | End: 2024-05-21 | Stop reason: SDUPTHER

## 2024-04-30 ENCOUNTER — TELEPHONE (OUTPATIENT)
Dept: PRIMARY CARE | Facility: CLINIC | Age: 69
End: 2024-04-30
Payer: MEDICARE

## 2024-04-30 DIAGNOSIS — I10 HYPERTENSION, UNSPECIFIED TYPE: ICD-10-CM

## 2024-04-30 DIAGNOSIS — Z86.79 HISTORY OF CAD (CORONARY ARTERY DISEASE): ICD-10-CM

## 2024-04-30 DIAGNOSIS — Z87.39 HISTORY OF GOUT: ICD-10-CM

## 2024-04-30 RX ORDER — CARVEDILOL 6.25 MG/1
12.5 TABLET ORAL 2 TIMES DAILY
Qty: 360 TABLET | Refills: 3 | OUTPATIENT
Start: 2024-04-30 | End: 2025-04-30

## 2024-05-06 RX ORDER — ALLOPURINOL 100 MG/1
100 TABLET ORAL DAILY
Qty: 90 TABLET | Refills: 3 | Status: SHIPPED | OUTPATIENT
Start: 2024-05-06 | End: 2025-05-06

## 2024-05-06 RX ORDER — CARVEDILOL 6.25 MG/1
12.5 TABLET ORAL 2 TIMES DAILY
Qty: 360 TABLET | Refills: 3 | Status: SHIPPED | OUTPATIENT
Start: 2024-05-06 | End: 2025-05-06

## 2024-05-06 NOTE — TELEPHONE ENCOUNTER
Patient called in and would like a refill on the following medications.....  carvedilol (Coreg) 6.25 mg tablet , allopurinol (Zyloprim) 100 mg tablet

## 2024-05-21 ENCOUNTER — TELEPHONE (OUTPATIENT)
Dept: PRIMARY CARE | Facility: CLINIC | Age: 69
End: 2024-05-21
Payer: MEDICARE

## 2024-05-21 DIAGNOSIS — G89.29 OTHER CHRONIC PAIN: ICD-10-CM

## 2024-05-21 RX ORDER — HYDROCODONE BITARTRATE AND ACETAMINOPHEN 7.5; 325 MG/1; MG/1
1 TABLET ORAL 4 TIMES DAILY
Qty: 120 TABLET | Refills: 0 | Status: SHIPPED | OUTPATIENT
Start: 2024-05-21 | End: 2024-06-20

## 2024-05-21 NOTE — TELEPHONE ENCOUNTER
Patient called:  Please send refill to [facility]  HYDROcodone-acetaminophen (Norco) 7.5-325 mg tablet

## 2024-06-13 ENCOUNTER — TELEPHONE (OUTPATIENT)
Dept: PRIMARY CARE | Facility: CLINIC | Age: 69
End: 2024-06-13
Payer: MEDICARE

## 2024-06-13 NOTE — TELEPHONE ENCOUNTER
Was stung by a wasp around 7 pm last night and hand is still swollen. Asking if there is anything he can do for it. Asking for a call back.

## 2024-06-14 ENCOUNTER — TELEPHONE (OUTPATIENT)
Dept: PRIMARY CARE | Facility: CLINIC | Age: 69
End: 2024-06-14
Payer: MEDICARE

## 2024-06-17 ENCOUNTER — APPOINTMENT (OUTPATIENT)
Dept: PRIMARY CARE | Facility: CLINIC | Age: 69
End: 2024-06-17
Payer: MEDICARE

## 2024-06-18 ENCOUNTER — LAB (OUTPATIENT)
Dept: LAB | Facility: LAB | Age: 69
End: 2024-06-18
Payer: MEDICARE

## 2024-06-18 DIAGNOSIS — I43 CARDIOMYOPATHY DUE TO HYPERTENSION, WITHOUT HEART FAILURE (MULTI): ICD-10-CM

## 2024-06-18 DIAGNOSIS — E55.9 VITAMIN D DEFICIENCY: ICD-10-CM

## 2024-06-18 DIAGNOSIS — D64.9 CHRONIC ANEMIA: ICD-10-CM

## 2024-06-18 DIAGNOSIS — Z00.00 HEALTHCARE MAINTENANCE: ICD-10-CM

## 2024-06-18 DIAGNOSIS — I11.9 CARDIOMYOPATHY DUE TO HYPERTENSION, WITHOUT HEART FAILURE (MULTI): ICD-10-CM

## 2024-06-18 DIAGNOSIS — E03.8 SUBCLINICAL HYPOTHYROIDISM: ICD-10-CM

## 2024-06-18 LAB
25(OH)D3 SERPL-MCNC: 39 NG/ML (ref 30–100)
ALBUMIN SERPL BCP-MCNC: 3.9 G/DL (ref 3.4–5)
ALP SERPL-CCNC: 57 U/L (ref 33–136)
ALT SERPL W P-5'-P-CCNC: 6 U/L (ref 10–52)
ANION GAP SERPL CALC-SCNC: 9 MMOL/L (ref 10–20)
AST SERPL W P-5'-P-CCNC: 13 U/L (ref 9–39)
BASOPHILS # BLD AUTO: 0.02 X10*3/UL (ref 0–0.1)
BASOPHILS NFR BLD AUTO: 0.4 %
BILIRUB SERPL-MCNC: 0.6 MG/DL (ref 0–1.2)
BUN SERPL-MCNC: 12 MG/DL (ref 6–23)
CALCIUM SERPL-MCNC: 8.9 MG/DL (ref 8.6–10.3)
CHLORIDE SERPL-SCNC: 109 MMOL/L (ref 98–107)
CO2 SERPL-SCNC: 25 MMOL/L (ref 21–32)
CREAT SERPL-MCNC: 1.51 MG/DL (ref 0.5–1.3)
EGFRCR SERPLBLD CKD-EPI 2021: 50 ML/MIN/1.73M*2
EOSINOPHIL # BLD AUTO: 0.19 X10*3/UL (ref 0–0.7)
EOSINOPHIL NFR BLD AUTO: 3.7 %
ERYTHROCYTE [DISTWIDTH] IN BLOOD BY AUTOMATED COUNT: 13.7 % (ref 11.5–14.5)
GLUCOSE SERPL-MCNC: 85 MG/DL (ref 74–99)
HCT VFR BLD AUTO: 37.3 % (ref 41–52)
HGB BLD-MCNC: 12.1 G/DL (ref 13.5–17.5)
IMM GRANULOCYTES # BLD AUTO: 0.02 X10*3/UL (ref 0–0.7)
IMM GRANULOCYTES NFR BLD AUTO: 0.4 % (ref 0–0.9)
LYMPHOCYTES # BLD AUTO: 1.09 X10*3/UL (ref 1.2–4.8)
LYMPHOCYTES NFR BLD AUTO: 21.1 %
MCH RBC QN AUTO: 30 PG (ref 26–34)
MCHC RBC AUTO-ENTMCNC: 32.4 G/DL (ref 32–36)
MCV RBC AUTO: 93 FL (ref 80–100)
MONOCYTES # BLD AUTO: 0.59 X10*3/UL (ref 0.1–1)
MONOCYTES NFR BLD AUTO: 11.4 %
NEUTROPHILS # BLD AUTO: 3.26 X10*3/UL (ref 1.2–7.7)
NEUTROPHILS NFR BLD AUTO: 63 %
NRBC BLD-RTO: 0 /100 WBCS (ref 0–0)
PLATELET # BLD AUTO: 223 X10*3/UL (ref 150–450)
POTASSIUM SERPL-SCNC: 4 MMOL/L (ref 3.5–5.3)
PROT SERPL-MCNC: 5.8 G/DL (ref 6.4–8.2)
PSA SERPL-MCNC: 0.07 NG/ML
RBC # BLD AUTO: 4.03 X10*6/UL (ref 4.5–5.9)
SODIUM SERPL-SCNC: 139 MMOL/L (ref 136–145)
T4 FREE SERPL-MCNC: 0.97 NG/DL (ref 0.61–1.12)
TSH SERPL-ACNC: 4.38 MIU/L (ref 0.44–3.98)
WBC # BLD AUTO: 5.2 X10*3/UL (ref 4.4–11.3)

## 2024-06-18 PROCEDURE — 36415 COLL VENOUS BLD VENIPUNCTURE: CPT

## 2024-06-18 PROCEDURE — 84439 ASSAY OF FREE THYROXINE: CPT

## 2024-06-18 PROCEDURE — 80053 COMPREHEN METABOLIC PANEL: CPT

## 2024-06-18 PROCEDURE — 84153 ASSAY OF PSA TOTAL: CPT

## 2024-06-18 PROCEDURE — 82306 VITAMIN D 25 HYDROXY: CPT

## 2024-06-18 PROCEDURE — 85025 COMPLETE CBC W/AUTO DIFF WBC: CPT

## 2024-06-18 PROCEDURE — 84443 ASSAY THYROID STIM HORMONE: CPT

## 2024-06-20 ENCOUNTER — APPOINTMENT (OUTPATIENT)
Dept: PRIMARY CARE | Facility: CLINIC | Age: 69
End: 2024-06-20
Payer: MEDICARE

## 2024-06-20 VITALS
HEART RATE: 66 BPM | DIASTOLIC BLOOD PRESSURE: 78 MMHG | OXYGEN SATURATION: 97 % | WEIGHT: 130 LBS | SYSTOLIC BLOOD PRESSURE: 132 MMHG | BODY MASS INDEX: 20.51 KG/M2

## 2024-06-20 DIAGNOSIS — I10 PRIMARY HYPERTENSION: ICD-10-CM

## 2024-06-20 DIAGNOSIS — K44.9 HIATAL HERNIA: ICD-10-CM

## 2024-06-20 DIAGNOSIS — R19.00 PELVIC MASS IN MALE: ICD-10-CM

## 2024-06-20 DIAGNOSIS — K63.5 POLYP OF COLON, UNSPECIFIED PART OF COLON, UNSPECIFIED TYPE: ICD-10-CM

## 2024-06-20 DIAGNOSIS — N18.31 STAGE 3A CHRONIC KIDNEY DISEASE (MULTI): ICD-10-CM

## 2024-06-20 DIAGNOSIS — D64.9 CHRONIC ANEMIA: ICD-10-CM

## 2024-06-20 DIAGNOSIS — E78.2 MIXED HYPERLIPIDEMIA: ICD-10-CM

## 2024-06-20 DIAGNOSIS — Z94.0 HISTORY OF KIDNEY TRANSPLANT (HHS-HCC): ICD-10-CM

## 2024-06-20 DIAGNOSIS — E03.8 SUBCLINICAL HYPOTHYROIDISM: ICD-10-CM

## 2024-06-20 DIAGNOSIS — K21.9 GASTROESOPHAGEAL REFLUX DISEASE WITHOUT ESOPHAGITIS: ICD-10-CM

## 2024-06-20 DIAGNOSIS — G89.29 OTHER CHRONIC PAIN: ICD-10-CM

## 2024-06-20 DIAGNOSIS — L57.0 ACTINIC KERATOSIS: Primary | ICD-10-CM

## 2024-06-20 DIAGNOSIS — F41.8 SITUATIONAL ANXIETY: ICD-10-CM

## 2024-06-20 DIAGNOSIS — F32.1 DEPRESSION, MAJOR, SINGLE EPISODE, MODERATE (MULTI): ICD-10-CM

## 2024-06-20 DIAGNOSIS — C44.90 SKIN CANCER: ICD-10-CM

## 2024-06-20 DIAGNOSIS — M25.852 MASS OF JOINT OF LEFT HIP: ICD-10-CM

## 2024-06-20 DIAGNOSIS — I71.21 ANEURYSM OF ASCENDING AORTA WITHOUT RUPTURE (CMS-HCC): ICD-10-CM

## 2024-06-20 DIAGNOSIS — E55.9 VITAMIN D DEFICIENCY: ICD-10-CM

## 2024-06-20 PROCEDURE — 1160F RVW MEDS BY RX/DR IN RCRD: CPT | Performed by: FAMILY MEDICINE

## 2024-06-20 PROCEDURE — 1159F MED LIST DOCD IN RCRD: CPT | Performed by: FAMILY MEDICINE

## 2024-06-20 PROCEDURE — 3078F DIAST BP <80 MM HG: CPT | Performed by: FAMILY MEDICINE

## 2024-06-20 PROCEDURE — 3075F SYST BP GE 130 - 139MM HG: CPT | Performed by: FAMILY MEDICINE

## 2024-06-20 PROCEDURE — 99214 OFFICE O/P EST MOD 30 MIN: CPT | Performed by: FAMILY MEDICINE

## 2024-06-20 PROCEDURE — 1036F TOBACCO NON-USER: CPT | Performed by: FAMILY MEDICINE

## 2024-06-20 RX ORDER — HYDROCODONE BITARTRATE AND ACETAMINOPHEN 7.5; 325 MG/1; MG/1
1 TABLET ORAL 4 TIMES DAILY
Qty: 120 TABLET | Refills: 0 | Status: SHIPPED | OUTPATIENT
Start: 2024-06-20 | End: 2024-07-20

## 2024-06-20 RX ORDER — DOXYCYCLINE 100 MG/1
100 CAPSULE ORAL 2 TIMES DAILY
COMMUNITY
Start: 2024-06-14

## 2024-06-20 NOTE — PROGRESS NOTES
Subjective   Patient ID: Shahid Us is a 69 y.o. male who presents for Med Management (headaches).    HPI   Still sore on top of head where he hit it in February.  Also into left eyes.   Better. CT unremarkable.       Hip pain and arthritis in back. 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 At this point just observing.  Bilateral hip replacement. Left unrepaired biceps tendon rupture. Dr Joseph as needed.  Now pain is back in left hip. Still in pain from 3-8. Switched to oxycodone due to lack of availability of Norco.  Felt better on Norco.  Drops about 4. Will last about 4 hours.  Can maintain most normal activity with the meds. The enlargement on the leg seemed to be a little worse last winter but Xray unchanged      Cardiomyopathy - EF in October 2022 45-50% global hypokinesis. No swelling or orthopnea. Does feel he needs to take a deep breath at times.      Had colonoscopy for polyp and subsequent bleeding in abdomen. That has resolved on last CT.   Had hemoglobin down to 7.8 then, in June 12.1. Colonoscopy 10/2022      Hypertension and hyperlipidemia - Blood pressure has been 130s/80s.   No side effects. No Chest pain, palpitations, numbness, weakness, edema, claudication, or double vision/ loss of vision. Dr Perez changed him from amlodipine to Losartan. No swelling with losartan. And back on simvastatin with good Lipid last September.       Depression and situation anxiety is doing well on current medications. Had been losing weight at about 20 pounds over last 1 1/2 years. Now stable at 130 and BMI 20.       Gout has not been an issue on allopurinol.  UA 4.0 in January      CKD 3a and transplant 1979 - GFR 50.   No blood. Mild anemia. Azathioprine. Dr Perez follows.      Subclinical Hypothyroid - was 4.38  this time.  Free T4 0.97.  Reports cold intolerance, fatigue, joint aches, not on levothyroxine/synthroid. Started it but made legs achy.      Vitamin D deficiency good on  supplement weekly. 39 in June      GERD and HH - No HB, Melena, dysphagia, or hematochezia. On Omeprazole and famotidine.  Has diffuse abdomen pain.. Comes and goes. BM OK. No change with food     Chronic anemia - HGB 12.1 in  June      AKs on nose and ears and hands - Dr Casanova cut one on hand, right arm and neck. The one on hand SCC.  In January removed AK from lip .      TAA 4.2 on 10/2022. Saw Dr Mckeon last fall . . Can go every 1-2 years since stable      Ruptured biceps tendon - did see NP with Dr Joseph's office and was given option to repair or just leave it. It is feeling better. No change in strength. Still able to carry bucket.     Spinal stenosis - keeps awake and pain meds help that.       Testicles hurt off and on. No mass     UDS 8/6/21 as expected for medication profile 9/12/22 as expected for medication profile 9/20/23 as expected for medication profile   CSA 9/20/23  PSA 6/18/24  Colonoscopy 10/28/22 Clear and due in 2027. Polyp in 2021.   LW and DPA yes wife   Pneumovax UTD     Review of Systems    Objective   /78 (BP Location: Left arm, Patient Position: Sitting)   Pulse 66   Wt 59 kg (130 lb)   SpO2 97%   BMI 20.51 kg/m²     Physical Exam  Vitals reviewed.   Constitutional:       General: He is not in acute distress.     Appearance: Normal appearance. He is normal weight.   HENT:      Head: Normocephalic.      Right Ear: Tympanic membrane, ear canal and external ear normal.      Left Ear: Tympanic membrane, ear canal and external ear normal.      Nose: Nose normal.      Mouth/Throat:      Mouth: Mucous membranes are moist.      Pharynx: Oropharynx is clear.   Eyes:      Extraocular Movements: Extraocular movements intact.      Conjunctiva/sclera: Conjunctivae normal.      Pupils: Pupils are equal, round, and reactive to light.   Neck:      Vascular: No carotid bruit.   Cardiovascular:      Rate and Rhythm: Normal rate and regular rhythm.      Pulses: Normal pulses.      Heart sounds:  Normal heart sounds. No murmur heard.  Pulmonary:      Effort: Pulmonary effort is normal. No respiratory distress.      Breath sounds: Normal breath sounds.   Abdominal:      General: Abdomen is flat. Bowel sounds are normal. There is no distension.      Palpations: Abdomen is soft. There is mass (hard LLQ).      Tenderness: There is no abdominal tenderness.   Musculoskeletal:         General: Deformity (left hip prominance) present.      Cervical back: Normal range of motion and neck supple. No tenderness.   Lymphadenopathy:      Cervical: No cervical adenopathy.   Skin:     General: Skin is warm and dry.      Findings: No rash.   Neurological:      General: No focal deficit present.      Mental Status: He is alert and oriented to person, place, and time.   Psychiatric:         Mood and Affect: Mood normal.         Thought Content: Thought content normal.         Judgment: Judgment normal.         Assessment/Plan   Diagnoses and all orders for this visit:  Actinic keratosis  Primary hypertension  -     Follow Up In Primary Care - Established  Other chronic pain  -     HYDROcodone-acetaminophen (Norco) 7.5-325 mg tablet; Take 1 tablet by mouth 4 times a day.  Chronic anemia  Depression, major, single episode, moderate (Multi)  Gastroesophageal reflux disease without esophagitis  Hiatal hernia  History of kidney transplant ([facility])  Mixed hyperlipidemia  Mass of joint of left hip  Pelvic mass in male  Polyp of colon, unspecified part of colon, unspecified type  Situational anxiety  Skin cancer  Subclinical hypothyroidism  Stage 3a chronic kidney disease (Multi)  Aneurysm of ascending aorta without rupture (CMS-HCC)  Vitamin D deficiency

## 2024-07-09 ENCOUNTER — LAB (OUTPATIENT)
Dept: LAB | Facility: LAB | Age: 69
End: 2024-07-09
Payer: MEDICARE

## 2024-07-09 DIAGNOSIS — N18.31 STAGE 3A CHRONIC KIDNEY DISEASE (MULTI): ICD-10-CM

## 2024-07-09 LAB
ANION GAP SERPL CALC-SCNC: 10 MMOL/L (ref 10–20)
BUN SERPL-MCNC: 12 MG/DL (ref 6–23)
CALCIUM SERPL-MCNC: 8.7 MG/DL (ref 8.6–10.3)
CHLORIDE SERPL-SCNC: 109 MMOL/L (ref 98–107)
CO2 SERPL-SCNC: 27 MMOL/L (ref 21–32)
CREAT SERPL-MCNC: 1.46 MG/DL (ref 0.5–1.3)
EGFRCR SERPLBLD CKD-EPI 2021: 52 ML/MIN/1.73M*2
GLUCOSE SERPL-MCNC: 92 MG/DL (ref 74–99)
POTASSIUM SERPL-SCNC: 4 MMOL/L (ref 3.5–5.3)
SODIUM SERPL-SCNC: 142 MMOL/L (ref 136–145)

## 2024-07-09 PROCEDURE — 36415 COLL VENOUS BLD VENIPUNCTURE: CPT

## 2024-07-09 PROCEDURE — 80048 BASIC METABOLIC PNL TOTAL CA: CPT

## 2024-07-11 ENCOUNTER — APPOINTMENT (OUTPATIENT)
Dept: NEPHROLOGY | Facility: CLINIC | Age: 69
End: 2024-07-11
Payer: MEDICARE

## 2024-07-11 VITALS
HEIGHT: 67 IN | BODY MASS INDEX: 20.91 KG/M2 | WEIGHT: 133.2 LBS | HEART RATE: 63 BPM | SYSTOLIC BLOOD PRESSURE: 152 MMHG | DIASTOLIC BLOOD PRESSURE: 70 MMHG

## 2024-07-11 DIAGNOSIS — I10 PRIMARY HYPERTENSION: ICD-10-CM

## 2024-07-11 DIAGNOSIS — Z94.0 HISTORY OF KIDNEY TRANSPLANT (HHS-HCC): ICD-10-CM

## 2024-07-11 DIAGNOSIS — N18.31 STAGE 3A CHRONIC KIDNEY DISEASE (MULTI): Primary | ICD-10-CM

## 2024-07-11 PROCEDURE — 1160F RVW MEDS BY RX/DR IN RCRD: CPT | Performed by: INTERNAL MEDICINE

## 2024-07-11 PROCEDURE — 99213 OFFICE O/P EST LOW 20 MIN: CPT | Performed by: INTERNAL MEDICINE

## 2024-07-11 PROCEDURE — 1036F TOBACCO NON-USER: CPT | Performed by: INTERNAL MEDICINE

## 2024-07-11 PROCEDURE — 1159F MED LIST DOCD IN RCRD: CPT | Performed by: INTERNAL MEDICINE

## 2024-07-11 PROCEDURE — 3078F DIAST BP <80 MM HG: CPT | Performed by: INTERNAL MEDICINE

## 2024-07-11 PROCEDURE — 3077F SYST BP >= 140 MM HG: CPT | Performed by: INTERNAL MEDICINE

## 2024-07-11 ASSESSMENT — ENCOUNTER SYMPTOMS
ALLERGIC/IMMUNOLOGIC NEGATIVE: 1
HEMATOLOGIC/LYMPHATIC NEGATIVE: 1
EYES NEGATIVE: 1
ENDOCRINE NEGATIVE: 1
PSYCHIATRIC NEGATIVE: 1
NEUROLOGICAL NEGATIVE: 1
GASTROINTESTINAL NEGATIVE: 1
MUSCULOSKELETAL NEGATIVE: 1
RESPIRATORY NEGATIVE: 1
CARDIOVASCULAR NEGATIVE: 1
CONSTITUTIONAL NEGATIVE: 1

## 2024-07-11 NOTE — PROGRESS NOTES
Subjective   He measures BP at home.    At home 140/70.  He is doing well and feels well    Patient ID: Shahid Us is a 69 y.o. male who presents for Follow-up (6 month ck/Review labs 7/9).  HPI  He is here for follow-up with chronic kidney disease with a renal transplant.  Labs were obtained last on July 9  Metabolic panel shows a BUN of 12 and creatinine of 1.46 this is very stable for him estimated GFR is 52 electrolytes look quite good chloride just a little on the high side at 109  Medications are reviewed on allopurinol aspirin azathioprine for his immunosuppression carvedilol vitamin D losartan a statin a PPI  Review of Systems   Constitutional: Negative.    HENT: Negative.     Eyes: Negative.    Respiratory: Negative.     Cardiovascular: Negative.    Gastrointestinal: Negative.    Endocrine: Negative.    Genitourinary: Negative.    Musculoskeletal: Negative.    Skin: Negative.    Allergic/Immunologic: Negative.    Neurological: Negative.    Hematological: Negative.    Psychiatric/Behavioral: Negative.         Objective   Physical Exam  Constitutional:       Appearance: Normal appearance.   HENT:      Head: Normocephalic and atraumatic.      Right Ear: External ear normal.      Left Ear: External ear normal.      Nose: Nose normal.      Mouth/Throat:      Mouth: Mucous membranes are moist.      Pharynx: Oropharynx is clear.   Eyes:      Extraocular Movements: Extraocular movements intact.      Conjunctiva/sclera: Conjunctivae normal.      Pupils: Pupils are equal, round, and reactive to light.   Cardiovascular:      Rate and Rhythm: Normal rate and regular rhythm.   Pulmonary:      Effort: Pulmonary effort is normal.      Breath sounds: Normal breath sounds.   Abdominal:      General: Abdomen is flat.      Palpations: Abdomen is soft.   Skin:     General: Skin is warm and dry.   Neurological:      General: No focal deficit present.      Mental Status: He is alert and oriented to person, place, and  time.   Psychiatric:         Mood and Affect: Mood normal.         Behavior: Behavior normal.         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

## 2024-07-18 ENCOUNTER — TELEPHONE (OUTPATIENT)
Dept: PRIMARY CARE | Facility: CLINIC | Age: 69
End: 2024-07-18
Payer: MEDICARE

## 2024-07-18 DIAGNOSIS — G89.29 OTHER CHRONIC PAIN: ICD-10-CM

## 2024-07-18 RX ORDER — HYDROCODONE BITARTRATE AND ACETAMINOPHEN 7.5; 325 MG/1; MG/1
1 TABLET ORAL 4 TIMES DAILY
Qty: 120 TABLET | Refills: 0 | Status: SHIPPED | OUTPATIENT
Start: 2024-07-18 | End: 2024-08-17

## 2024-07-18 NOTE — TELEPHONE ENCOUNTER
Patient called in and would like a refill on the following medication....  HYDROcodone-acetaminophen (Norco) 7.5-325 mg tablet [[id number]]  Called into [facility] pharmacy in [place].    Thank you

## 2024-07-26 ENCOUNTER — TELEPHONE (OUTPATIENT)
Dept: PRIMARY CARE | Facility: CLINIC | Age: 69
End: 2024-07-26
Payer: MEDICARE

## 2024-07-26 NOTE — TELEPHONE ENCOUNTER
HE WAS IN THE ED FOR POISON IVY ON HIS HAND, HE HAS QUESTIONS ABOUT THE MEDICINE THEY GAVE HIM. THANK YOU.

## 2024-08-01 ENCOUNTER — TELEPHONE (OUTPATIENT)
Dept: PRIMARY CARE | Facility: CLINIC | Age: 69
End: 2024-08-01
Payer: MEDICARE

## 2024-08-01 DIAGNOSIS — N18.31 STAGE 3A CHRONIC KIDNEY DISEASE (MULTI): ICD-10-CM

## 2024-08-01 RX ORDER — AZATHIOPRINE 50 MG/1
25 TABLET ORAL EVERY OTHER DAY
Qty: 30 TABLET | Refills: 2 | Status: SHIPPED | OUTPATIENT
Start: 2024-08-01

## 2024-08-01 NOTE — TELEPHONE ENCOUNTER
Please send refill to  Pharmacy    [id number] - [place], [place] - [address] [place] [address]  [address] [facility] [id number]  Phone: [phone]  Fax: [phone]       azaTHIOprine (Imuran) 50 mg tablet

## 2024-08-19 ENCOUNTER — TELEPHONE (OUTPATIENT)
Dept: PRIMARY CARE | Facility: CLINIC | Age: 69
End: 2024-08-19
Payer: MEDICARE

## 2024-08-19 DIAGNOSIS — G89.29 OTHER CHRONIC PAIN: ICD-10-CM

## 2024-08-19 RX ORDER — HYDROCODONE BITARTRATE AND ACETAMINOPHEN 7.5; 325 MG/1; MG/1
1 TABLET ORAL 4 TIMES DAILY
Qty: 120 TABLET | Refills: 0 | Status: SHIPPED | OUTPATIENT
Start: 2024-08-19 | End: 2024-09-18

## 2024-08-19 NOTE — TELEPHONE ENCOUNTER
Please send to  Pharmacy    [id number] - [place], [place] - [address] [place] [id number]  Phone: [phone]  Fax: [phone]     HYDROcodone-acetaminophen (Norco) 7.5-325 mg tablet [[id number]]

## 2024-09-03 ENCOUNTER — TELEPHONE (OUTPATIENT)
Dept: PRIMARY CARE | Facility: CLINIC | Age: 69
End: 2024-09-03
Payer: MEDICARE

## 2024-09-03 DIAGNOSIS — N40.1 BENIGN PROSTATIC HYPERPLASIA WITH LOWER URINARY TRACT SYMPTOMS, SYMPTOM DETAILS UNSPECIFIED: ICD-10-CM

## 2024-09-03 RX ORDER — TAMSULOSIN HYDROCHLORIDE 0.4 MG/1
0.4 CAPSULE ORAL NIGHTLY
Qty: 90 CAPSULE | Refills: 3 | Status: SHIPPED | OUTPATIENT
Start: 2024-09-03

## 2024-09-03 NOTE — TELEPHONE ENCOUNTER
Patient called in and would like a refill of the following medication....tamsulosin (Flomax) 0.4 mg 24 hr capsule called into [facility] pharmacy in [place].

## 2024-09-06 ENCOUNTER — OFFICE VISIT (OUTPATIENT)
Dept: PRIMARY CARE | Facility: CLINIC | Age: 69
End: 2024-09-06
Payer: MEDICARE

## 2024-09-06 VITALS
BODY MASS INDEX: 20.83 KG/M2 | SYSTOLIC BLOOD PRESSURE: 130 MMHG | OXYGEN SATURATION: 95 % | WEIGHT: 132 LBS | DIASTOLIC BLOOD PRESSURE: 74 MMHG | HEART RATE: 88 BPM

## 2024-09-06 DIAGNOSIS — I11.9 CARDIOMYOPATHY DUE TO HYPERTENSION, WITHOUT HEART FAILURE (MULTI): ICD-10-CM

## 2024-09-06 DIAGNOSIS — K63.5 POLYP OF COLON, UNSPECIFIED PART OF COLON, UNSPECIFIED TYPE: ICD-10-CM

## 2024-09-06 DIAGNOSIS — G89.29 CHRONIC RIGHT LOWER QUADRANT PAIN: ICD-10-CM

## 2024-09-06 DIAGNOSIS — K21.9 GASTROESOPHAGEAL REFLUX DISEASE WITHOUT ESOPHAGITIS: Primary | ICD-10-CM

## 2024-09-06 DIAGNOSIS — R10.31 CHRONIC RIGHT LOWER QUADRANT PAIN: ICD-10-CM

## 2024-09-06 DIAGNOSIS — K44.9 HIATAL HERNIA: ICD-10-CM

## 2024-09-06 DIAGNOSIS — M25.852 MASS OF JOINT OF LEFT HIP: ICD-10-CM

## 2024-09-06 DIAGNOSIS — N18.31 STAGE 3A CHRONIC KIDNEY DISEASE (MULTI): ICD-10-CM

## 2024-09-06 DIAGNOSIS — I43 CARDIOMYOPATHY DUE TO HYPERTENSION, WITHOUT HEART FAILURE (MULTI): ICD-10-CM

## 2024-09-06 DIAGNOSIS — C44.90 SKIN CANCER: ICD-10-CM

## 2024-09-06 DIAGNOSIS — E78.2 MIXED HYPERLIPIDEMIA: ICD-10-CM

## 2024-09-06 DIAGNOSIS — D64.9 CHRONIC ANEMIA: ICD-10-CM

## 2024-09-06 DIAGNOSIS — S46.212D RUPTURE OF LEFT PROXIMAL BICEPS TENDON, SUBSEQUENT ENCOUNTER: ICD-10-CM

## 2024-09-06 DIAGNOSIS — E03.8 SUBCLINICAL HYPOTHYROIDISM: ICD-10-CM

## 2024-09-06 DIAGNOSIS — I10 PRIMARY HYPERTENSION: ICD-10-CM

## 2024-09-06 DIAGNOSIS — R19.00 PELVIC MASS IN MALE: ICD-10-CM

## 2024-09-06 DIAGNOSIS — Z94.0 HISTORY OF KIDNEY TRANSPLANT (HHS-HCC): ICD-10-CM

## 2024-09-06 DIAGNOSIS — F32.1 DEPRESSION, MAJOR, SINGLE EPISODE, MODERATE (MULTI): ICD-10-CM

## 2024-09-06 DIAGNOSIS — M48.061 NEUROFORAMINAL STENOSIS OF LUMBAR SPINE: ICD-10-CM

## 2024-09-06 DIAGNOSIS — Z79.899 MEDICATION MANAGEMENT: ICD-10-CM

## 2024-09-06 DIAGNOSIS — I71.21 ANEURYSM OF ASCENDING AORTA WITHOUT RUPTURE (CMS-HCC): ICD-10-CM

## 2024-09-06 DIAGNOSIS — F41.8 SITUATIONAL ANXIETY: ICD-10-CM

## 2024-09-06 PROCEDURE — 1036F TOBACCO NON-USER: CPT | Performed by: FAMILY MEDICINE

## 2024-09-06 PROCEDURE — 80365 DRUG SCREENING OXYCODONE: CPT

## 2024-09-06 PROCEDURE — 80307 DRUG TEST PRSMV CHEM ANLYZR: CPT

## 2024-09-06 PROCEDURE — 80361 OPIATES 1 OR MORE: CPT

## 2024-09-06 PROCEDURE — 80358 DRUG SCREENING METHADONE: CPT

## 2024-09-06 PROCEDURE — 99214 OFFICE O/P EST MOD 30 MIN: CPT | Performed by: FAMILY MEDICINE

## 2024-09-06 PROCEDURE — 80346 BENZODIAZEPINES1-12: CPT

## 2024-09-06 PROCEDURE — 1159F MED LIST DOCD IN RCRD: CPT | Performed by: FAMILY MEDICINE

## 2024-09-06 PROCEDURE — 80354 DRUG SCREENING FENTANYL: CPT

## 2024-09-06 PROCEDURE — 1160F RVW MEDS BY RX/DR IN RCRD: CPT | Performed by: FAMILY MEDICINE

## 2024-09-06 PROCEDURE — 80368 SEDATIVE HYPNOTICS: CPT

## 2024-09-06 PROCEDURE — 82570 ASSAY OF URINE CREATININE: CPT

## 2024-09-06 PROCEDURE — 3078F DIAST BP <80 MM HG: CPT | Performed by: FAMILY MEDICINE

## 2024-09-06 PROCEDURE — 3075F SYST BP GE 130 - 139MM HG: CPT | Performed by: FAMILY MEDICINE

## 2024-09-06 PROCEDURE — 80373 DRUG SCREENING TRAMADOL: CPT

## 2024-09-06 RX ORDER — BUPROPION HYDROCHLORIDE 150 MG/1
150 TABLET, EXTENDED RELEASE ORAL 2 TIMES DAILY
Qty: 180 TABLET | Refills: 3 | Status: SHIPPED | OUTPATIENT
Start: 2024-09-06

## 2024-09-06 RX ORDER — SIMVASTATIN 40 MG/1
40 TABLET, FILM COATED ORAL NIGHTLY
Qty: 90 TABLET | Refills: 3 | Status: SHIPPED | OUTPATIENT
Start: 2024-09-06 | End: 2025-09-06

## 2024-09-06 RX ORDER — SUCRALFATE 1 G/1
1 TABLET ORAL
Qty: 60 TABLET | Refills: 1 | Status: SHIPPED | OUTPATIENT
Start: 2024-09-06 | End: 2024-10-06

## 2024-09-06 ASSESSMENT — PATIENT HEALTH QUESTIONNAIRE - PHQ9
1. LITTLE INTEREST OR PLEASURE IN DOING THINGS: NOT AT ALL
2. FEELING DOWN, DEPRESSED OR HOPELESS: NOT AT ALL
SUM OF ALL RESPONSES TO PHQ9 QUESTIONS 1 AND 2: 0

## 2024-09-06 NOTE — PROGRESS NOTES
Subjective   Patient ID: Shahid Us is a 69 y.o. male who presents for Med Management (Stomach pain from Prednisone).    HPI   Still sore on top of head where he hit it in February.  Also into left eyes.   Better. CT unremarkable.       Hip pain and arthritis in back. 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 At this point just observing.  Bilateral hip replacement. Left unrepaired biceps tendon rupture. Dr Joseph as needed.  Now pain is back in left hip. Still in pain from 3-8. Switched to oxycodone due to lack of availability of Norco.  Felt better on Norco.  Drops about 4. Will last about 4 hours.  Can maintain most normal activity with the meds. The enlargement on the leg seemed to be a little worse last winter but Xray unchanged      Cardiomyopathy - EF in October 2022 45-50% global hypokinesis. No swelling or orthopnea. Does feel he needs to take a deep breath at times.      Had colonoscopy for polyp and subsequent bleeding in abdomen. That has resolved on last CT.   Had hemoglobin down to 7.8 then, in August 12.5  Colonoscopy 10/28/22      Hypertension and hyperlipidemia - Blood pressure has been 130s/80s.   No side effects. No Chest pain, palpitations, numbness, weakness, edema, claudication, or double vision/ loss of vision. Dr Perez changed him from amlodipine to Losartan. No swelling with losartan. And back on simvastatin with good Lipid last September.       Depression and situation anxiety is doing well on current medications. Had been losing weight at about 20 pounds over last 1 1/2 years. Now stable at 130 and BMI 20.       Gout has not been an issue on allopurinol.  UA 4.0 in January      CKD 3a and transplant 1979 - GFR 51 on August 1.   No blood. Mild anemia. Azathioprine. Dr Perez follows.      Subclinical Hypothyroid - was 4.38  last time.  Free T4 0.97.  Reports cold intolerance, fatigue, joint aches, not on levothyroxine/synthroid.  Started it but made legs achy.       Vitamin D deficiency good on supplement weekly. 39 in June      GERD and HH - No HB, Melena, dysphagia, or hematochezia. On Omeprazole and famotidine.  Has diffuse abdomen pain made worse when on Prednisone.  He is done with that.  He is still having more heartburn.  Will try a couple weeks of Carafate.  BM OK.  Food helps      Chronic anemia - HGB 12.1 in  June      AKs on nose and ears and hands - Dr Casanova cut one on hand, right arm and neck. The one on hand SCC.  In January removed AK from lip. Dermatology annually     TAA 4.2 on 10/2022. Saw Dr Mckeon last fall .  Can go every 1-2 years since stable so due this year       Ruptured biceps tendon - did see NP with Dr Joseph's office and was given option to repair or just leave it. It is feeling better. No change in strength. Still able to carry bucket.     Spinal stenosis - keeps awake and pain meds help that.       Testicles hurt off and on. No mass     UDS 8/6/21 as expected for medication profile 9/12/22 as expected for medication profile 9/20/23 as expected for medication profile 9/5/24 as expected for medication profile   CSA 9/5/24   PSA 6/18/24  Colonoscopy 10/28/22 Clear and due in 2027. Polyp in 2021.   LW and DPA yes wife   Pneumovax UTD     Review of Systems    Objective   /74 (BP Location: Left arm, Patient Position: Sitting)   Pulse 88   Wt 59.9 kg (132 lb)   SpO2 95%   BMI 20.83 kg/m²     Physical Exam  Vitals reviewed.   Constitutional:       General: He is not in acute distress.     Appearance: Normal appearance. He is normal weight.   HENT:      Head: Normocephalic.      Right Ear: Tympanic membrane, ear canal and external ear normal.      Left Ear: Tympanic membrane, ear canal and external ear normal.      Nose: Nose normal.      Mouth/Throat:      Mouth: Mucous membranes are moist.      Pharynx: Oropharynx is clear.   Eyes:      Extraocular Movements: Extraocular movements intact.      Conjunctiva/sclera: Conjunctivae normal.       Pupils: Pupils are equal, round, and reactive to light.   Neck:      Vascular: No carotid bruit.   Cardiovascular:      Rate and Rhythm: Normal rate and regular rhythm.      Pulses: Normal pulses.      Heart sounds: Normal heart sounds. No murmur heard.  Pulmonary:      Effort: Pulmonary effort is normal. No respiratory distress.      Breath sounds: Normal breath sounds.   Abdominal:      General: Abdomen is flat. Bowel sounds are normal. There is no distension.      Palpations: Abdomen is soft. There is mass (hard LLQ.).      Tenderness: There is abdominal tenderness (diffuse but most in Epigastrium and RUQ). There is no guarding.   Musculoskeletal:         General: Deformity (left hip prominant.) present.      Cervical back: Normal range of motion and neck supple. No tenderness.   Lymphadenopathy:      Cervical: No cervical adenopathy.   Skin:     General: Skin is warm and dry.      Findings: No rash.   Neurological:      General: No focal deficit present.      Mental Status: He is alert and oriented to person, place, and time.   Psychiatric:         Mood and Affect: Mood normal.         Thought Content: Thought content normal.         Judgment: Judgment normal.         Assessment/Plan   Diagnoses and all orders for this visit:  Gastroesophageal reflux disease without esophagitis  -     sucralfate (Carafate) 1 gram tablet; Take 1 tablet (1 g) by mouth 4 times a day before meals.  Primary hypertension  -     Follow Up In Primary Care - Established  Depression, major, single episode, moderate (Multi)  -     buPROPion SR (Wellbutrin SR) 150 mg 12 hr tablet; Take 1 tablet (150 mg) by mouth 2 times a day.  Mixed hyperlipidemia  -     simvastatin (Zocor) 40 mg tablet; Take 1 tablet (40 mg) by mouth once daily at bedtime.  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

## 2024-09-07 LAB
AMPHETAMINES UR QL SCN: NORMAL
BARBITURATES UR QL SCN: NORMAL
BZE UR QL SCN: NORMAL
CANNABINOIDS UR QL SCN: NORMAL
CREAT UR-MCNC: 90.3 MG/DL (ref 20–370)
PCP UR QL SCN: NORMAL

## 2024-09-10 ENCOUNTER — HOSPITAL ENCOUNTER (OUTPATIENT)
Dept: RADIOLOGY | Facility: HOSPITAL | Age: 69
Discharge: HOME | End: 2024-09-10
Payer: MEDICARE

## 2024-09-10 DIAGNOSIS — I71.21 ANEURYSM OF ASCENDING AORTA WITHOUT RUPTURE (CMS-HCC): ICD-10-CM

## 2024-09-10 PROCEDURE — 71250 CT THORAX DX C-: CPT

## 2024-09-10 PROCEDURE — 71250 CT THORAX DX C-: CPT | Performed by: RADIOLOGY

## 2024-09-11 LAB
1OH-MIDAZOLAM UR CFM-MCNC: <25 NG/ML
6MAM UR CFM-MCNC: <25 NG/ML
7AMINOCLONAZEPAM UR CFM-MCNC: <25 NG/ML
A-OH ALPRAZ UR CFM-MCNC: <25 NG/ML
ALPRAZ UR CFM-MCNC: <25 NG/ML
CHLORDIAZEP UR CFM-MCNC: <25 NG/ML
CLONAZEPAM UR CFM-MCNC: <25 NG/ML
CODEINE UR CFM-MCNC: <50 NG/ML
DIAZEPAM UR CFM-MCNC: <25 NG/ML
EDDP UR CFM-MCNC: <25 NG/ML
FENTANYL UR CFM-MCNC: <2.5 NG/ML
HYDROCODONE CTO UR CFM-MCNC: 2190 NG/ML
HYDROMORPHONE UR CFM-MCNC: 44 NG/ML
LORAZEPAM UR CFM-MCNC: <25 NG/ML
METHADONE UR CFM-MCNC: <25 NG/ML
MIDAZOLAM UR CFM-MCNC: <25 NG/ML
MORPHINE UR CFM-MCNC: <50 NG/ML
NORDIAZEPAM UR CFM-MCNC: <25 NG/ML
NORFENTANYL UR CFM-MCNC: <2.5 NG/ML
NORHYDROCODONE UR CFM-MCNC: >1000 NG/ML
NOROXYCODONE UR CFM-MCNC: <25 NG/ML
NORTRAMADOL UR-MCNC: <50 NG/ML
OXAZEPAM UR CFM-MCNC: <25 NG/ML
OXYCODONE UR CFM-MCNC: <25 NG/ML
OXYMORPHONE UR CFM-MCNC: <25 NG/ML
TEMAZEPAM UR CFM-MCNC: <25 NG/ML
TRAMADOL UR CFM-MCNC: <50 NG/ML
ZOLPIDEM UR CFM-MCNC: <25 NG/ML
ZOLPIDEM UR-MCNC: <25 NG/ML

## 2024-09-12 NOTE — RESULT ENCOUNTER NOTE
Let him know that everything looks the same, aneurysm, lung nodules, and hiatal hernia.  So just needs annual follow up.

## 2024-09-17 ENCOUNTER — TELEPHONE (OUTPATIENT)
Dept: PRIMARY CARE | Facility: CLINIC | Age: 69
End: 2024-09-17
Payer: MEDICARE

## 2024-09-17 DIAGNOSIS — G89.29 OTHER CHRONIC PAIN: ICD-10-CM

## 2024-09-17 RX ORDER — HYDROCODONE BITARTRATE AND ACETAMINOPHEN 7.5; 325 MG/1; MG/1
1 TABLET ORAL 4 TIMES DAILY
Qty: 120 TABLET | Refills: 0 | Status: SHIPPED | OUTPATIENT
Start: 2024-09-17 | End: 2024-10-17

## 2024-09-18 DIAGNOSIS — K21.9 GASTROESOPHAGEAL REFLUX DISEASE WITHOUT ESOPHAGITIS: ICD-10-CM

## 2024-09-18 RX ORDER — SUCRALFATE 1 G/1
1 TABLET ORAL
Qty: 360 TABLET | Refills: 1 | Status: SHIPPED | OUTPATIENT
Start: 2024-09-18

## 2024-09-20 ENCOUNTER — APPOINTMENT (OUTPATIENT)
Dept: PRIMARY CARE | Facility: CLINIC | Age: 69
End: 2024-09-20
Payer: MEDICARE

## 2024-10-08 ENCOUNTER — TELEPHONE (OUTPATIENT)
Dept: PRIMARY CARE | Facility: CLINIC | Age: 69
End: 2024-10-08
Payer: MEDICARE

## 2024-10-08 DIAGNOSIS — N40.1 BENIGN PROSTATIC HYPERPLASIA WITH LOWER URINARY TRACT SYMPTOMS, SYMPTOM DETAILS UNSPECIFIED: ICD-10-CM

## 2024-10-08 RX ORDER — FINASTERIDE 5 MG/1
5 TABLET, FILM COATED ORAL DAILY
Qty: 90 TABLET | Refills: 3 | OUTPATIENT
Start: 2024-10-08

## 2024-10-08 RX ORDER — FINASTERIDE 5 MG/1
5 TABLET, FILM COATED ORAL DAILY
Qty: 30 TABLET | Refills: 2 | Status: SHIPPED | OUTPATIENT
Start: 2024-10-08

## 2024-10-10 ENCOUNTER — TELEPHONE (OUTPATIENT)
Dept: PRIMARY CARE | Facility: CLINIC | Age: 69
End: 2024-10-10
Payer: MEDICARE

## 2024-10-10 DIAGNOSIS — G89.29 OTHER CHRONIC PAIN: ICD-10-CM

## 2024-10-10 DIAGNOSIS — N40.1 BENIGN PROSTATIC HYPERPLASIA WITH LOWER URINARY TRACT SYMPTOMS, SYMPTOM DETAILS UNSPECIFIED: ICD-10-CM

## 2024-10-10 RX ORDER — FINASTERIDE 5 MG/1
5 TABLET, FILM COATED ORAL DAILY
Qty: 90 TABLET | OUTPATIENT
Start: 2024-10-10

## 2024-10-14 RX ORDER — HYDROCODONE BITARTRATE AND ACETAMINOPHEN 7.5; 325 MG/1; MG/1
1 TABLET ORAL 4 TIMES DAILY
Qty: 120 TABLET | Refills: 0 | Status: SHIPPED | OUTPATIENT
Start: 2024-10-17 | End: 2024-11-16

## 2024-11-13 ENCOUNTER — TELEPHONE (OUTPATIENT)
Dept: PRIMARY CARE | Facility: CLINIC | Age: 69
End: 2024-11-13
Payer: MEDICARE

## 2024-11-13 DIAGNOSIS — K21.9 GASTROESOPHAGEAL REFLUX DISEASE, UNSPECIFIED WHETHER ESOPHAGITIS PRESENT: ICD-10-CM

## 2024-11-13 DIAGNOSIS — G89.29 OTHER CHRONIC PAIN: ICD-10-CM

## 2024-11-13 RX ORDER — FAMOTIDINE 40 MG/1
40 TABLET, FILM COATED ORAL NIGHTLY
Qty: 90 TABLET | Refills: 1 | Status: SHIPPED | OUTPATIENT
Start: 2024-11-13

## 2024-11-13 RX ORDER — HYDROCODONE BITARTRATE AND ACETAMINOPHEN 7.5; 325 MG/1; MG/1
1 TABLET ORAL 4 TIMES DAILY
Qty: 120 TABLET | Refills: 0 | Status: SHIPPED | OUTPATIENT
Start: 2024-11-17 | End: 2024-12-17

## 2024-11-22 ENCOUNTER — TELEPHONE (OUTPATIENT)
Dept: PRIMARY CARE | Facility: CLINIC | Age: 69
End: 2024-11-22
Payer: MEDICARE

## 2024-11-22 DIAGNOSIS — R42 VERTIGO: Primary | ICD-10-CM

## 2024-11-22 RX ORDER — MECLIZINE HCL 12.5 MG 12.5 MG/1
12.5 TABLET ORAL 3 TIMES DAILY PRN
Qty: 20 TABLET | Refills: 0 | Status: SHIPPED | OUTPATIENT
Start: 2024-11-22 | End: 2025-11-22

## 2024-11-22 NOTE — TELEPHONE ENCOUNTER
Message from patient that around 5p last night,  he had an episode of vertigo and things were spinning. That lasted about 5 minutes.  During the night he stated he had the spinning sensation again, but it didn't last long.  This morning he states his head feels funny and he feels weak.  Is there something you can Rx?  Please advise

## 2024-11-22 NOTE — TELEPHONE ENCOUNTER
Pc to patient and let him know Meclizine was sent in for him to take as needed for the dizziness.   If he has persistent dizziness like last night or develops any other symptoms, he needs to go to ER, could be a sign of a ministroke.

## 2024-12-02 ENCOUNTER — TELEPHONE (OUTPATIENT)
Dept: PRIMARY CARE | Facility: CLINIC | Age: 69
End: 2024-12-02
Payer: MEDICARE

## 2024-12-02 DIAGNOSIS — R42 VERTIGO: ICD-10-CM

## 2024-12-02 RX ORDER — MECLIZINE HCL 12.5 MG 12.5 MG/1
12.5 TABLET ORAL 3 TIMES DAILY PRN
Qty: 20 TABLET | Refills: 1 | Status: SHIPPED | OUTPATIENT
Start: 2024-12-02

## 2024-12-09 ENCOUNTER — LAB (OUTPATIENT)
Dept: LAB | Facility: LAB | Age: 69
End: 2024-12-09
Payer: MEDICARE

## 2024-12-09 DIAGNOSIS — E78.2 MIXED HYPERLIPIDEMIA: ICD-10-CM

## 2024-12-09 DIAGNOSIS — N18.31 STAGE 3A CHRONIC KIDNEY DISEASE (MULTI): ICD-10-CM

## 2024-12-09 LAB
ALBUMIN SERPL BCP-MCNC: 3.6 G/DL (ref 3.4–5)
ALP SERPL-CCNC: 54 U/L (ref 33–136)
ALT SERPL W P-5'-P-CCNC: 7 U/L (ref 10–52)
ANION GAP SERPL CALC-SCNC: 9 MMOL/L (ref 10–20)
AST SERPL W P-5'-P-CCNC: 14 U/L (ref 9–39)
BILIRUB SERPL-MCNC: 0.7 MG/DL (ref 0–1.2)
BUN SERPL-MCNC: 10 MG/DL (ref 6–23)
CALCIUM SERPL-MCNC: 8.7 MG/DL (ref 8.6–10.3)
CHLORIDE SERPL-SCNC: 107 MMOL/L (ref 98–107)
CHOLEST SERPL-MCNC: 108 MG/DL (ref 0–199)
CHOLESTEROL/HDL RATIO: 2.2
CO2 SERPL-SCNC: 29 MMOL/L (ref 21–32)
CREAT SERPL-MCNC: 1.42 MG/DL (ref 0.5–1.3)
EGFRCR SERPLBLD CKD-EPI 2021: 54 ML/MIN/1.73M*2
ERYTHROCYTE [DISTWIDTH] IN BLOOD BY AUTOMATED COUNT: 13 % (ref 11.5–14.5)
GLUCOSE SERPL-MCNC: 96 MG/DL (ref 74–99)
HCT VFR BLD AUTO: 36.8 % (ref 41–52)
HDLC SERPL-MCNC: 49 MG/DL
HGB BLD-MCNC: 11.8 G/DL (ref 13.5–17.5)
LDLC SERPL CALC-MCNC: 42 MG/DL
MCH RBC QN AUTO: 29.8 PG (ref 26–34)
MCHC RBC AUTO-ENTMCNC: 32.1 G/DL (ref 32–36)
MCV RBC AUTO: 93 FL (ref 80–100)
NON HDL CHOLESTEROL: 59 MG/DL (ref 0–149)
NRBC BLD-RTO: 0 /100 WBCS (ref 0–0)
PLATELET # BLD AUTO: 207 X10*3/UL (ref 150–450)
POTASSIUM SERPL-SCNC: 3.8 MMOL/L (ref 3.5–5.3)
PROT SERPL-MCNC: 5.2 G/DL (ref 6.4–8.2)
RBC # BLD AUTO: 3.96 X10*6/UL (ref 4.5–5.9)
SODIUM SERPL-SCNC: 141 MMOL/L (ref 136–145)
TRIGL SERPL-MCNC: 86 MG/DL (ref 0–149)
VLDL: 17 MG/DL (ref 0–40)
WBC # BLD AUTO: 6 X10*3/UL (ref 4.4–11.3)

## 2024-12-09 PROCEDURE — 80061 LIPID PANEL: CPT

## 2024-12-09 PROCEDURE — 85027 COMPLETE CBC AUTOMATED: CPT

## 2024-12-09 PROCEDURE — 80053 COMPREHEN METABOLIC PANEL: CPT

## 2024-12-09 PROCEDURE — 36415 COLL VENOUS BLD VENIPUNCTURE: CPT

## 2024-12-10 ENCOUNTER — APPOINTMENT (OUTPATIENT)
Dept: PRIMARY CARE | Facility: CLINIC | Age: 69
End: 2024-12-10
Payer: MEDICARE

## 2024-12-10 ENCOUNTER — TELEPHONE (OUTPATIENT)
Dept: PRIMARY CARE | Facility: CLINIC | Age: 69
End: 2024-12-10

## 2024-12-10 VITALS
DIASTOLIC BLOOD PRESSURE: 80 MMHG | BODY MASS INDEX: 20.99 KG/M2 | WEIGHT: 133 LBS | SYSTOLIC BLOOD PRESSURE: 136 MMHG | OXYGEN SATURATION: 96 % | HEART RATE: 68 BPM

## 2024-12-10 DIAGNOSIS — G89.29 CHRONIC RIGHT LOWER QUADRANT PAIN: ICD-10-CM

## 2024-12-10 DIAGNOSIS — E55.9 VITAMIN D DEFICIENCY: ICD-10-CM

## 2024-12-10 DIAGNOSIS — I11.9 CARDIOMYOPATHY DUE TO HYPERTENSION, WITHOUT HEART FAILURE: Primary | ICD-10-CM

## 2024-12-10 DIAGNOSIS — E78.2 MIXED HYPERLIPIDEMIA: ICD-10-CM

## 2024-12-10 DIAGNOSIS — R19.00 PELVIC MASS IN MALE: ICD-10-CM

## 2024-12-10 DIAGNOSIS — D64.9 CHRONIC ANEMIA: ICD-10-CM

## 2024-12-10 DIAGNOSIS — I10 PRIMARY HYPERTENSION: ICD-10-CM

## 2024-12-10 DIAGNOSIS — F32.1 DEPRESSION, MAJOR, SINGLE EPISODE, MODERATE (MULTI): ICD-10-CM

## 2024-12-10 DIAGNOSIS — E03.8 SUBCLINICAL HYPOTHYROIDISM: ICD-10-CM

## 2024-12-10 DIAGNOSIS — C44.90 SKIN CANCER: ICD-10-CM

## 2024-12-10 DIAGNOSIS — K21.9 GASTROESOPHAGEAL REFLUX DISEASE WITHOUT ESOPHAGITIS: ICD-10-CM

## 2024-12-10 DIAGNOSIS — I43 CARDIOMYOPATHY DUE TO HYPERTENSION, WITHOUT HEART FAILURE: Primary | ICD-10-CM

## 2024-12-10 DIAGNOSIS — Z94.0 HISTORY OF KIDNEY TRANSPLANT (HHS-HCC): ICD-10-CM

## 2024-12-10 DIAGNOSIS — I71.21 ANEURYSM OF ASCENDING AORTA WITHOUT RUPTURE (CMS-HCC): ICD-10-CM

## 2024-12-10 DIAGNOSIS — N18.31 STAGE 3A CHRONIC KIDNEY DISEASE (MULTI): ICD-10-CM

## 2024-12-10 DIAGNOSIS — S46.212D RUPTURE OF LEFT PROXIMAL BICEPS TENDON, SUBSEQUENT ENCOUNTER: ICD-10-CM

## 2024-12-10 DIAGNOSIS — F41.8 SITUATIONAL ANXIETY: ICD-10-CM

## 2024-12-10 DIAGNOSIS — K63.5 POLYP OF COLON, UNSPECIFIED PART OF COLON, UNSPECIFIED TYPE: ICD-10-CM

## 2024-12-10 DIAGNOSIS — K44.9 HIATAL HERNIA: ICD-10-CM

## 2024-12-10 DIAGNOSIS — M25.852 MASS OF JOINT OF LEFT HIP: ICD-10-CM

## 2024-12-10 DIAGNOSIS — R10.31 CHRONIC RIGHT LOWER QUADRANT PAIN: ICD-10-CM

## 2024-12-10 PROCEDURE — G2211 COMPLEX E/M VISIT ADD ON: HCPCS | Performed by: FAMILY MEDICINE

## 2024-12-10 PROCEDURE — 3075F SYST BP GE 130 - 139MM HG: CPT | Performed by: FAMILY MEDICINE

## 2024-12-10 PROCEDURE — 99214 OFFICE O/P EST MOD 30 MIN: CPT | Performed by: FAMILY MEDICINE

## 2024-12-10 PROCEDURE — 3079F DIAST BP 80-89 MM HG: CPT | Performed by: FAMILY MEDICINE

## 2024-12-10 PROCEDURE — 1160F RVW MEDS BY RX/DR IN RCRD: CPT | Performed by: FAMILY MEDICINE

## 2024-12-10 PROCEDURE — 1036F TOBACCO NON-USER: CPT | Performed by: FAMILY MEDICINE

## 2024-12-10 PROCEDURE — 1159F MED LIST DOCD IN RCRD: CPT | Performed by: FAMILY MEDICINE

## 2024-12-10 RX ORDER — ISOSORBIDE MONONITRATE 30 MG/1
30 TABLET, EXTENDED RELEASE ORAL DAILY
Qty: 90 TABLET | Refills: 2 | Status: SHIPPED | OUTPATIENT
Start: 2024-12-10

## 2024-12-10 NOTE — TELEPHONE ENCOUNTER
Message from patient asking if Sandip will be able to Rx his pain medications after Dr. Talley retires.  Also wants to know who will send in his pain med when it's due on 12/20/24?    Please call him.

## 2024-12-10 NOTE — PROGRESS NOTES
Subjective   Patient ID: Shahid Us is a 69 y.o. male who presents for Med Management.    HPI   Still sore on top of head where he hit it in February.  Also into left eyes.   Better. CT unremarkable again in November.        Hip pain and arthritis in back. - Has a left pelvic mass. At this point just observing.  Bilateral hip replacement. Left unrepaired biceps tendon rupture hurts off and on. Dr Joseph as needed.  Now pain is back in left hip. Still in pain from 3-8. Switched to oxycodone due to lack of availability of Norco.  Felt better on Norco.  Drops about 4. Will last about 3 hours.  Can maintain most normal activity with the meds. Can sleep better. Does note more pain with the cold weather and more activity.  The enlargement on the leg seemed to be about the same.      Cardiomyopathy - EF in October 2022 45-50% global hypokinesis. No swelling or orthopnea. Does feel he needs to take a deep breath at times.      Had colonoscopy for polyp and subsequent bleeding in abdomen.  Had hemoglobin down to 7.8 but stable now at 11.8.   Colonoscopy 10/28/22      Hypertension and hyperlipidemia - Blood pressure has been 130s/80s.   No side effects. No Chest pain, palpitations, numbness, weakness, edema, claudication, or double vision/ loss of vision. Dr Perez changed him from amlodipine to Losartan. No swelling with losartan. And back on simvastatin with good Lipid this time.         Depression and situation anxiety is doing well on current medications. Had been losing weight at about 20 pounds over last 1 1/2 years. Now stable at 133 and BMI 20.       Gout has not been an issue on allopurinol.  UA 4.0 in January      CKD 3a and transplant 1979 - GFR 54 this time.   No blood. Mild anemia. Azathioprine. Dr Perez follows in January.      Subclinical Hypothyroid - was 4.38  in June with free T4 0.97.  Reports cold intolerance, fatigue, joint aches, not on levothyroxine/synthroid.  Started it but made legs achy.       Vitamin D deficiency good on supplement weekly. 39 in June      GERD and HH - No HB, Melena, dysphagia, or hematochezia. On Omeprazole and famotidine.  Has diffuse abdomen pain made worse when on Prednisone.  He is done with that.  BM OK.  Food helps      Chronic anemia - HGB 11.8 this time. Normal indices so chronic disease.      AKs on nose and ears and hands - Dr Casanova cut one on hand, right arm and neck. The one on hand SCC.  In January removed AK from lip. Dermatology annually     TAA 4.1 in September 2024. Has been stable for 4 years so can go every 1-2 years.       Ruptured biceps tendon - did see NP with Dr Joseph's office and was given option to repair or just leave it. It is feeling better. No change in strength. Still able to carry bucket.   Had a bruise a few weeks ago      Spinal stenosis - keeps awake and pain meds help that.       Testicles hurt off and on. No mass    Vertigo for a long time off and on. Went to ER as he was having more frequent. CT was fine with no CVA.. Still some when he gets up. Meclizine helps the rest of the day. Has had for 3-4 years with no progression. Unsteady      BPH nocturia unchanged at 2.  On Finasteride.      UDS 8/6/21 as expected for medication profile 9/12/22 as expected for medication profile 9/20/23 as expected for medication profile 9/5/24 as expected for medication profile   CSA 9/5/24   PSA 6/18/24  Colonoscopy 10/28/22 Clear and due in 2027. Polyp in 2021.   LW and DPA yes wife   Pneumovax UTD     Review of Systems    Objective   /80 (BP Location: Left arm, Patient Position: Sitting)   Pulse 68   Wt 60.3 kg (133 lb)   SpO2 96%   BMI 20.99 kg/m²     Physical Exam  Vitals reviewed.   Constitutional:       General: He is not in acute distress.     Appearance: Normal appearance.   HENT:      Head: Normocephalic.      Right Ear: Tympanic membrane normal.      Left Ear: Tympanic membrane normal.      Nose: Nose normal.      Mouth/Throat:      Pharynx:  Oropharynx is clear.   Eyes:      Extraocular Movements: Extraocular movements intact.      Conjunctiva/sclera: Conjunctivae normal.      Pupils: Pupils are equal, round, and reactive to light.   Neck:      Vascular: No carotid bruit.   Cardiovascular:      Rate and Rhythm: Normal rate and regular rhythm.      Pulses: Normal pulses.      Heart sounds: Normal heart sounds. No murmur heard.  Pulmonary:      Effort: Pulmonary effort is normal. No respiratory distress.      Breath sounds: Normal breath sounds.   Abdominal:      General: Abdomen is flat. Bowel sounds are normal. There is no distension.      Palpations: Abdomen is soft. There is mass (hard in LLQ).      Tenderness: There is no abdominal tenderness.   Musculoskeletal:         General: Tenderness (paraspinal) and deformity (prominance of greater trochanter left) present.      Cervical back: Normal range of motion and neck supple. No tenderness.   Lymphadenopathy:      Cervical: No cervical adenopathy.   Skin:     General: Skin is warm and dry.      Findings: Lesion (SK that is tender right temple) present. No rash.   Neurological:      General: No focal deficit present.      Mental Status: He is alert and oriented to person, place, and time.   Psychiatric:         Mood and Affect: Mood normal.         Thought Content: Thought content normal.         Judgment: Judgment normal.         Assessment/Plan   Diagnoses and all orders for this visit:  Cardiomyopathy due to hypertension, without heart failure  Primary hypertension  -     isosorbide mononitrate ER (Imdur) 30 mg 24 hr tablet; Take 1 tablet (30 mg) by mouth once daily.  -     Follow Up In Primary Care - Established; Future  Chronic anemia  Chronic right lower quadrant pain  Depression, major, single episode, moderate (Multi)  Gastroesophageal reflux disease without esophagitis  Hiatal hernia  History of kidney transplant ([facility])  Mixed hyperlipidemia  Mass of joint of left hip  Pelvic mass in  male  Polyp of colon, unspecified part of colon, unspecified type  Rupture of left proximal biceps tendon, subsequent encounter  Situational anxiety  Skin cancer  Stage 3a chronic kidney disease (Multi)  Subclinical hypothyroidism  Aneurysm of ascending aorta without rupture (CMS-HCC)  Vitamin D deficiency

## 2024-12-10 NOTE — TELEPHONE ENCOUNTER
Pc to patient and let him know dr. Talley will address his refill for pain med this month and yes, Sandip will be able to Rx his pain med. After dr. Talley retires.

## 2024-12-16 ENCOUNTER — TELEPHONE (OUTPATIENT)
Dept: PRIMARY CARE | Facility: CLINIC | Age: 69
End: 2024-12-16
Payer: MEDICARE

## 2024-12-16 DIAGNOSIS — G89.29 OTHER CHRONIC PAIN: ICD-10-CM

## 2024-12-16 RX ORDER — HYDROCODONE BITARTRATE AND ACETAMINOPHEN 7.5; 325 MG/1; MG/1
1 TABLET ORAL 4 TIMES DAILY
Qty: 120 TABLET | Refills: 0 | Status: SHIPPED | OUTPATIENT
Start: 2024-12-16 | End: 2025-01-15

## 2025-01-06 ENCOUNTER — TELEPHONE (OUTPATIENT)
Dept: PRIMARY CARE | Facility: CLINIC | Age: 70
End: 2025-01-06
Payer: MEDICARE

## 2025-01-06 DIAGNOSIS — N40.1 BENIGN PROSTATIC HYPERPLASIA WITH LOWER URINARY TRACT SYMPTOMS, SYMPTOM DETAILS UNSPECIFIED: ICD-10-CM

## 2025-01-07 RX ORDER — FINASTERIDE 5 MG/1
5 TABLET, FILM COATED ORAL DAILY
Qty: 90 TABLET | Refills: 1 | Status: SHIPPED | OUTPATIENT
Start: 2025-01-07 | End: 2025-07-06

## 2025-01-13 ENCOUNTER — TELEPHONE (OUTPATIENT)
Dept: PRIMARY CARE | Facility: CLINIC | Age: 70
End: 2025-01-13
Payer: MEDICARE

## 2025-01-14 ENCOUNTER — APPOINTMENT (OUTPATIENT)
Dept: NEPHROLOGY | Facility: CLINIC | Age: 70
End: 2025-01-14
Payer: MEDICARE

## 2025-01-14 VITALS
DIASTOLIC BLOOD PRESSURE: 84 MMHG | HEART RATE: 74 BPM | WEIGHT: 134.2 LBS | HEIGHT: 67 IN | BODY MASS INDEX: 21.06 KG/M2 | SYSTOLIC BLOOD PRESSURE: 156 MMHG

## 2025-01-14 DIAGNOSIS — Z94.0 HISTORY OF KIDNEY TRANSPLANT (HHS-HCC): ICD-10-CM

## 2025-01-14 DIAGNOSIS — G89.29 OTHER CHRONIC PAIN: ICD-10-CM

## 2025-01-14 DIAGNOSIS — E55.9 VITAMIN D DEFICIENCY: ICD-10-CM

## 2025-01-14 DIAGNOSIS — I10 PRIMARY HYPERTENSION: Primary | ICD-10-CM

## 2025-01-14 DIAGNOSIS — N18.31 STAGE 3A CHRONIC KIDNEY DISEASE (MULTI): ICD-10-CM

## 2025-01-14 PROCEDURE — 3008F BODY MASS INDEX DOCD: CPT | Performed by: CLINICAL NURSE SPECIALIST

## 2025-01-14 PROCEDURE — 1160F RVW MEDS BY RX/DR IN RCRD: CPT | Performed by: CLINICAL NURSE SPECIALIST

## 2025-01-14 PROCEDURE — 1159F MED LIST DOCD IN RCRD: CPT | Performed by: CLINICAL NURSE SPECIALIST

## 2025-01-14 PROCEDURE — 3079F DIAST BP 80-89 MM HG: CPT | Performed by: CLINICAL NURSE SPECIALIST

## 2025-01-14 PROCEDURE — 99213 OFFICE O/P EST LOW 20 MIN: CPT | Performed by: CLINICAL NURSE SPECIALIST

## 2025-01-14 PROCEDURE — 1036F TOBACCO NON-USER: CPT | Performed by: CLINICAL NURSE SPECIALIST

## 2025-01-14 PROCEDURE — 3077F SYST BP >= 140 MM HG: CPT | Performed by: CLINICAL NURSE SPECIALIST

## 2025-01-14 RX ORDER — HYDROCODONE BITARTRATE AND ACETAMINOPHEN 7.5; 325 MG/1; MG/1
1 TABLET ORAL 4 TIMES DAILY
Qty: 120 TABLET | Refills: 0 | Status: SHIPPED | OUTPATIENT
Start: 2025-01-14 | End: 2025-02-13

## 2025-01-14 ASSESSMENT — ENCOUNTER SYMPTOMS
CARDIOVASCULAR NEGATIVE: 1
NEUROLOGICAL NEGATIVE: 1
MYALGIAS: 1
RESPIRATORY NEGATIVE: 1
PSYCHIATRIC NEGATIVE: 1
BACK PAIN: 1
ENDOCRINE NEGATIVE: 1
CONSTITUTIONAL NEGATIVE: 1
GASTROINTESTINAL NEGATIVE: 1

## 2025-01-14 NOTE — PROGRESS NOTES
Subjective   Patient ID: Shahid Us is a 69 y.o. male who presents for No chief complaint on file..  Patient being seen in follow-up with history of renal transplant with chronic kidney disease and hypertension    Labs reviewed  Total cholesterol 108, HDL 49, LDL 42, triglycerides 86  Glucose 96  Sodium 141, potassium 3.8, chloride 107, bicarb 29  Renal function with a BUN of 10 creatinine of 1.42, GFR is 54  Calcium 8.7  H&H 11.8 and 36.8    Mainly complaining of aches and pains, fairly well-controlled with his pain medications  Has no edema  Has no difficulties voiding  Is tolerating his medications well  No complaints of fevers or chills        Review of Systems   Constitutional: Negative.    Respiratory: Negative.     Cardiovascular: Negative.    Gastrointestinal: Negative.    Endocrine: Negative.    Genitourinary: Negative.    Musculoskeletal:  Positive for back pain and myalgias.   Skin: Negative.    Neurological: Negative.    Psychiatric/Behavioral: Negative.         Objective   Physical Exam  Vitals reviewed.   Constitutional:       Appearance: Normal appearance.   HENT:      Head: Normocephalic.   Cardiovascular:      Rate and Rhythm: Normal rate and regular rhythm.   Pulmonary:      Effort: Pulmonary effort is normal.      Breath sounds: Normal breath sounds.   Abdominal:      Palpations: Abdomen is soft.   Musculoskeletal:         General: Normal range of motion.   Skin:     General: Skin is warm and dry.   Neurological:      Mental Status: He is alert and oriented to person, place, and time.   Psychiatric:         Mood and Affect: Mood normal.         Behavior: Behavior normal.       Assessment/Plan   Problem List Items Addressed This Visit             ICD-10-CM    Stage 3 chronic kidney disease (Multi) N18.30     Renal function is stable with creatinine of 1.42, he is staying within his baseline, blood pressure is well-controlled, posttransplant         Relevant Orders    Follow Up In Nephrology     Comprehensive metabolic panel    Uric acid    Vitamin D deficiency E55.9    History of kidney transplant ([facility]) Z94.0    Relevant Orders    Follow Up In Nephrology    Comprehensive metabolic panel    Uric acid    Hypertension - Primary I10     Blood pressure is well-controlled on carvedilol, isosorbide, losartan          Renal transplant from sister in 1979  History of glomerulonephritis  Chronic kidney disease with baseline creatinine 1.5-1.7:  Bilateral atrophic kidneys native  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

## 2025-01-14 NOTE — TELEPHONE ENCOUNTER
RX Proposed 01/14/25 to RA. Last appoint 09/06/24 CSA 09/06/24 UDS 09/06/24 Appoint with Sandip 03/18/25

## 2025-01-14 NOTE — ASSESSMENT & PLAN NOTE
Renal function is stable with creatinine of 1.42, he is staying within his baseline, blood pressure is well-controlled, posttransplant

## 2025-02-05 ENCOUNTER — OFFICE VISIT (OUTPATIENT)
Dept: PRIMARY CARE | Facility: CLINIC | Age: 70
End: 2025-02-05
Payer: MEDICARE

## 2025-02-05 VITALS
HEART RATE: 79 BPM | WEIGHT: 131.4 LBS | BODY MASS INDEX: 20.62 KG/M2 | SYSTOLIC BLOOD PRESSURE: 162 MMHG | OXYGEN SATURATION: 98 % | HEIGHT: 67 IN | DIASTOLIC BLOOD PRESSURE: 86 MMHG

## 2025-02-05 DIAGNOSIS — H60.502 ACUTE OTITIS EXTERNA OF LEFT EAR, UNSPECIFIED TYPE: Primary | ICD-10-CM

## 2025-02-05 PROCEDURE — 1036F TOBACCO NON-USER: CPT

## 2025-02-05 PROCEDURE — 3077F SYST BP >= 140 MM HG: CPT

## 2025-02-05 PROCEDURE — 1160F RVW MEDS BY RX/DR IN RCRD: CPT

## 2025-02-05 PROCEDURE — 1159F MED LIST DOCD IN RCRD: CPT

## 2025-02-05 PROCEDURE — 3079F DIAST BP 80-89 MM HG: CPT

## 2025-02-05 PROCEDURE — 99213 OFFICE O/P EST LOW 20 MIN: CPT

## 2025-02-05 PROCEDURE — 3008F BODY MASS INDEX DOCD: CPT

## 2025-02-05 RX ORDER — SIMVASTATIN 20 MG/1
20 TABLET, FILM COATED ORAL NIGHTLY
COMMUNITY
Start: 2025-01-29

## 2025-02-05 RX ORDER — CIPROFLOXACIN AND DEXAMETHASONE 3; 1 MG/ML; MG/ML
4 SUSPENSION/ DROPS AURICULAR (OTIC) 2 TIMES DAILY
Qty: 7.5 ML | Refills: 0 | Status: SHIPPED | OUTPATIENT
Start: 2025-02-05 | End: 2025-02-12

## 2025-02-05 RX ORDER — AMLODIPINE BESYLATE 5 MG/1
TABLET ORAL
COMMUNITY
Start: 2025-01-28

## 2025-02-05 ASSESSMENT — ENCOUNTER SYMPTOMS
SINUS PRESSURE: 0
SHORTNESS OF BREATH: 0
RHINORRHEA: 0
SORE THROAT: 0
COUGH: 0
SINUS PAIN: 0

## 2025-02-05 ASSESSMENT — PATIENT HEALTH QUESTIONNAIRE - PHQ9
SUM OF ALL RESPONSES TO PHQ9 QUESTIONS 1 AND 2: 0
2. FEELING DOWN, DEPRESSED OR HOPELESS: NOT AT ALL
1. LITTLE INTEREST OR PLEASURE IN DOING THINGS: NOT AT ALL

## 2025-02-05 NOTE — PROGRESS NOTES
"Subjective   Patient ID: Shahid Us is a 69 y.o. male who presents for Earache (Pt is here today for  left ear pain. ).    Patient presents for acute complaint of left ear pain.    Otitis externa: Tympanic membrane pearly white, no erythema. +Tragal pain, complains of ear fullness.  No sinus symptoms, no cough, no congestion, no sore throat.  Oropharynx not erythematous. Will prescribe patient Ciprodex drops for treatment.    No other acute or chronic complaints at this time.    Earache   Pertinent negatives include no coughing, ear discharge, rhinorrhea or sore throat.        Review of Systems   HENT:  Positive for ear pain. Negative for congestion, drooling, ear discharge, postnasal drip, rhinorrhea, sinus pressure, sinus pain and sore throat.    Respiratory:  Negative for cough and shortness of breath.        Objective   /86 Comment: reports he is very nervous  Pulse 79   Ht 1.695 m (5' 6.75\")   Wt 59.6 kg (131 lb 6.4 oz)   SpO2 98%   BMI 20.73 kg/m²     Physical Exam  Vitals and nursing note reviewed.   Constitutional:       General: He is not in acute distress.     Appearance: Normal appearance. He is normal weight.   HENT:      Head: Normocephalic.      Right Ear: Tympanic membrane, ear canal and external ear normal.      Left Ear: Tympanic membrane and ear canal normal.      Ears:      Comments: Left tragal pain with movement     Nose: Nose normal.      Mouth/Throat:      Mouth: Mucous membranes are moist.      Pharynx: Oropharynx is clear. No oropharyngeal exudate or posterior oropharyngeal erythema.   Eyes:      General: No scleral icterus.        Right eye: No discharge.         Left eye: No discharge.      Pupils: Pupils are equal, round, and reactive to light.   Cardiovascular:      Rate and Rhythm: Normal rate and regular rhythm.      Pulses: Normal pulses.      Heart sounds: Normal heart sounds.   Pulmonary:      Effort: Pulmonary effort is normal. No respiratory distress.      Breath " sounds: Normal breath sounds. No stridor. No wheezing, rhonchi or rales.   Chest:      Chest wall: No tenderness.   Abdominal:      General: Bowel sounds are normal.      Palpations: Abdomen is soft.   Musculoskeletal:         General: Normal range of motion.      Cervical back: Normal range of motion.   Skin:     General: Skin is warm and dry.      Capillary Refill: Capillary refill takes less than 2 seconds.   Neurological:      General: No focal deficit present.      Mental Status: He is alert and oriented to person, place, and time. Mental status is at baseline.   Psychiatric:         Mood and Affect: Mood normal.         Behavior: Behavior normal.         Thought Content: Thought content normal.         Judgment: Judgment normal.         Assessment/Plan   Diagnoses and all orders for this visit:  Acute otitis externa of left ear, unspecified type  -     ciprofloxacin-dexamethasone (CiproDEX) otic suspension; Administer 4 drops into the left ear 2 times a day for 7 days.

## 2025-02-07 ENCOUNTER — TELEPHONE (OUTPATIENT)
Dept: PRIMARY CARE | Facility: CLINIC | Age: 70
End: 2025-02-07
Payer: MEDICARE

## 2025-02-07 DIAGNOSIS — H60.502 ACUTE OTITIS EXTERNA OF LEFT EAR, UNSPECIFIED TYPE: Primary | ICD-10-CM

## 2025-02-07 RX ORDER — OFLOXACIN 3 MG/ML
5 SOLUTION AURICULAR (OTIC) 2 TIMES DAILY
Qty: 5 ML | Refills: 0 | Status: SHIPPED | OUTPATIENT
Start: 2025-02-07 | End: 2025-02-14

## 2025-02-07 NOTE — TELEPHONE ENCOUNTER
Reports the ear drops that were prescribed after insurance his co-pay was still 100. Is wanting something cheaper.

## 2025-02-10 ENCOUNTER — TELEPHONE (OUTPATIENT)
Dept: PRIMARY CARE | Facility: CLINIC | Age: 70
End: 2025-02-10
Payer: MEDICARE

## 2025-02-10 DIAGNOSIS — G89.29 OTHER CHRONIC PAIN: ICD-10-CM

## 2025-02-10 NOTE — TELEPHONE ENCOUNTER
RX Proposed 02/10/25 to RA. Last OV 12/10/24 CSA 09/06/24 UDS 09/06/24 Appointment with Sandip 03/18/25

## 2025-02-11 RX ORDER — HYDROCODONE BITARTRATE AND ACETAMINOPHEN 7.5; 325 MG/1; MG/1
1 TABLET ORAL 4 TIMES DAILY
Qty: 120 TABLET | Refills: 0 | Status: SHIPPED | OUTPATIENT
Start: 2025-02-11 | End: 2025-03-13

## 2025-03-11 ENCOUNTER — TELEPHONE (OUTPATIENT)
Dept: PRIMARY CARE | Facility: CLINIC | Age: 70
End: 2025-03-11
Payer: MEDICARE

## 2025-03-11 DIAGNOSIS — G89.29 OTHER CHRONIC PAIN: ICD-10-CM

## 2025-03-11 RX ORDER — HYDROCODONE BITARTRATE AND ACETAMINOPHEN 7.5; 325 MG/1; MG/1
1 TABLET ORAL 4 TIMES DAILY
Qty: 120 TABLET | Refills: 0 | Status: SHIPPED | OUTPATIENT
Start: 2025-03-11 | End: 2025-04-10

## 2025-03-11 NOTE — TELEPHONE ENCOUNTER
03/11/25 Patient is requesting a refill for   Hydrocodone-acetaminophen 7.5-325 mg tablet  [facility]

## 2025-03-18 ENCOUNTER — APPOINTMENT (OUTPATIENT)
Dept: PRIMARY CARE | Facility: CLINIC | Age: 70
End: 2025-03-18
Payer: MEDICARE

## 2025-03-18 VITALS
WEIGHT: 133 LBS | BODY MASS INDEX: 20.99 KG/M2 | DIASTOLIC BLOOD PRESSURE: 74 MMHG | HEART RATE: 78 BPM | OXYGEN SATURATION: 97 % | SYSTOLIC BLOOD PRESSURE: 128 MMHG

## 2025-03-18 DIAGNOSIS — I10 PRIMARY HYPERTENSION: ICD-10-CM

## 2025-03-18 DIAGNOSIS — Z79.899 CONTROLLED SUBSTANCE AGREEMENT SIGNED: ICD-10-CM

## 2025-03-18 DIAGNOSIS — Z00.00 ROUTINE GENERAL MEDICAL EXAMINATION AT HEALTH CARE FACILITY: Primary | ICD-10-CM

## 2025-03-18 PROCEDURE — 1170F FXNL STATUS ASSESSED: CPT

## 2025-03-18 PROCEDURE — 1159F MED LIST DOCD IN RCRD: CPT

## 2025-03-18 PROCEDURE — 1036F TOBACCO NON-USER: CPT

## 2025-03-18 PROCEDURE — 3074F SYST BP LT 130 MM HG: CPT

## 2025-03-18 PROCEDURE — G0439 PPPS, SUBSEQ VISIT: HCPCS

## 2025-03-18 PROCEDURE — 1160F RVW MEDS BY RX/DR IN RCRD: CPT

## 2025-03-18 PROCEDURE — 3078F DIAST BP <80 MM HG: CPT

## 2025-03-18 ASSESSMENT — ENCOUNTER SYMPTOMS
ARTHRALGIAS: 0
NERVOUS/ANXIOUS: 0
NAUSEA: 0
RECTAL PAIN: 0
WEAKNESS: 0
CHEST TIGHTNESS: 0
PALPITATIONS: 0
CONSTIPATION: 0
POLYDIPSIA: 0
MYALGIAS: 0
DIFFICULTY URINATING: 0
HEADACHES: 0
BACK PAIN: 1
UNEXPECTED WEIGHT CHANGE: 0
SHORTNESS OF BREATH: 0
DIAPHORESIS: 0
NUMBNESS: 0
CHILLS: 0
TROUBLE SWALLOWING: 0
ABDOMINAL PAIN: 0
POLYPHAGIA: 0
WOUND: 0
DIARRHEA: 0
FREQUENCY: 0
FATIGUE: 0

## 2025-03-18 ASSESSMENT — ACTIVITIES OF DAILY LIVING (ADL)
TAKING_MEDICATION: INDEPENDENT
DOING_HOUSEWORK: INDEPENDENT
DRESSING: INDEPENDENT
GROCERY_SHOPPING: INDEPENDENT
BATHING: INDEPENDENT
MANAGING_FINANCES: INDEPENDENT

## 2025-03-18 ASSESSMENT — PATIENT HEALTH QUESTIONNAIRE - PHQ9
SUM OF ALL RESPONSES TO PHQ9 QUESTIONS 1 AND 2: 0
1. LITTLE INTEREST OR PLEASURE IN DOING THINGS: NOT AT ALL
2. FEELING DOWN, DEPRESSED OR HOPELESS: NOT AT ALL

## 2025-03-18 NOTE — PROGRESS NOTES
Subjective   Reason for Visit: Shahid Us is an 69 y.o. male here for a Medicare Wellness visit.     Past Medical, Surgical, and Family History reviewed and updated in chart.    Reviewed all medications by prescribing practitioner or clinical pharmacist (such as prescriptions, OTCs, herbal therapies and supplements) and documented in the medical record.    Presents today for Medicare wellness checkup    Arthritis/scoliosis: Chronic back pain, uses oxycodone for control.  Works at current level.  New CSA and UDS obtained. Has scheduled appointment with pain management for further evaluation     Cardiomyopathy: Last echo, EF 45 to 50%, denies any cardiovascular symptoms at this time.  Sees Dr. Mckeon with [facility].  States his condition is unchanged and they are currently just monitoring.    Hypertension: Controlled on medications 128/74 in office today.  Denies cardiovascular symptoms.    Stage III CKD: Past transplant recipient (1979), GFR at baseline.  Followed by Dr. Perez.    Actinic keratosis: Sees Dr. Casanova, has had removal on his scalp he is waiting for pathology results.  They are also managing skin dryness of bilateral upper extremities.     Chronic anemia: Past GI bleed, hemoglobin at baseline.  No reported bleeding.     Preventive health: Colonoscopy due 2027, PSA 2024.  Pneumovax up-to-date.  Has LW and DPA.    OARRS:  No data recorded  I have personally reviewed the OARRS report for Shahid Us. I have considered the risks of abuse, dependence, addiction and diversion and I believe that it is clinically appropriate for Shahid Us to be prescribed this medication    Is the patient prescribed a combination of a benzodiazepine and opioid?  Yes, I feel it is clincially indicated to continue the medication and have discussed with the patient risks/benefits/alternatives.    Last Urine Drug Screen / ordered today: Yes  Recent Results (from the past 8760 hours)   Confirmation  Opiate/Opioid/Benzo Prescription Compliance    Collection Time: 09/06/24  3:47 PM   Result Value Ref Range    Clonazepam <25 <25 ng/mL    7-Aminoclonazepam <25 <25 ng/mL    Alprazolam <25 <25 ng/mL    Alpha-Hydroxyalprazolam <25 <25 ng/mL    Midazolam <25 <25 ng/mL    Alpha-Hydroxymidazolam <25 <25 ng/mL    Chlordiazepoxide <25 <25 ng/mL    Diazepam <25 <25 ng/mL    Nordiazepam <25 <25 ng/mL    Temazepam <25 <25 ng/mL    Oxazepam <25 <25 ng/mL    Lorazepam <25 <25 ng/mL    Methadone <25 <25 ng/mL    EDDP <25 <25 ng/mL    6-Acetylmorphine <25 <25 ng/mL    Codeine <50 <50 ng/mL    Hydrocodone 2,190 (H) <25 ng/mL    Hydromorphone 44 (H) <25 ng/mL    Morphine  <50 <50 ng/mL    Norhydrocodone >1,000 (H) <25 ng/mL    Noroxycodone <25 <25 ng/mL    Oxycodone <25 <25 ng/mL    Oxymorphone <25 <25 ng/mL    Fentanyl <2.5 <2.5 ng/mL    Norfentanyl <2.5 <2.5 ng/mL    Tramadol <50 <50 ng/mL    O-Desmethyltramadol <50 <50 ng/mL    Zolpidem <25 <25 ng/mL    Zolpidem Metabolite (ZCA) <25 <25 ng/mL   Screen Opiate/Opioid/Benzo Prescription Compliance    Collection Time: 09/06/24  3:47 PM   Result Value Ref Range    Creatinine, Urine Random 90.3 20.0 - 370.0 mg/dL    Amphetamine Screen, Urine Presumptive Negative Presumptive Negative    Barbiturate Screen, Urine Presumptive Negative Presumptive Negative    Cannabinoid Screen, Urine Presumptive Negative Presumptive Negative    Cocaine Metabolite Screen, Urine Presumptive Negative Presumptive Negative    PCP Screen, Urine Presumptive Negative Presumptive Negative     Results are as expected.         Controlled Substance Agreement:  Date of the Last Agreement: 3/18/25    Reviewed Controlled Substance Agreement including but not limited to the benefits, risks, and alternatives to treatment with a Controlled Substance medication(s).    Opioids:  What is the patient's goal of therapy? Pain control  Is this being achieved with current treatment? yes    I have calculated the patient's Morphine  Dose Equivalent (MED):   I have considered referral to Pain Management and/or a specialist, and do not feel it is necessary at this time.    I feel that it is clinically indicated to continue this current medication regimen after consideration of alternative therapies, and other non-opioid treatment.    Opioid Risk Screening:  No data recorded    Pain Assessment:  No data recorded      Patient Care Team:  TONIO Guerra-CNP as PCP - General (Family Medicine)     Review of Systems   Constitutional:  Negative for chills, diaphoresis, fatigue and unexpected weight change.   HENT:  Negative for dental problem, tinnitus and trouble swallowing.    Eyes:  Negative for visual disturbance.   Respiratory:  Negative for chest tightness and shortness of breath.    Cardiovascular:  Negative for chest pain, palpitations and leg swelling.   Gastrointestinal:  Negative for abdominal pain, constipation, diarrhea, nausea and rectal pain.   Endocrine: Negative for polydipsia, polyphagia and polyuria.   Genitourinary:  Negative for difficulty urinating, frequency and urgency.   Musculoskeletal:  Positive for back pain. Negative for arthralgias and myalgias.   Skin:  Negative for pallor and wound (Scalp).        Dry skin bilateral upper extremities    Neurological:  Negative for syncope, weakness, numbness and headaches.   Psychiatric/Behavioral:  Negative for suicidal ideas. The patient is not nervous/anxious.        Objective   Vitals:  /74 (BP Location: Right arm, Patient Position: Sitting)   Pulse 78   Wt 60.3 kg (133 lb)   SpO2 97%   BMI 20.99 kg/m²       Physical Exam  Vitals and nursing note reviewed.   Constitutional:       General: He is not in acute distress.     Appearance: Normal appearance.   HENT:      Head: Normocephalic.      Nose: Nose normal.      Mouth/Throat:      Mouth: Mucous membranes are moist.      Pharynx: Oropharynx is clear.   Eyes:      General: No scleral icterus.     Pupils: Pupils are  equal, round, and reactive to light.   Neck:      Vascular: No carotid bruit.   Cardiovascular:      Rate and Rhythm: Normal rate and regular rhythm.      Pulses: Normal pulses.      Heart sounds: Murmur heard.   Pulmonary:      Effort: Pulmonary effort is normal. No respiratory distress.      Breath sounds: Normal breath sounds. No stridor. No wheezing, rhonchi or rales.   Abdominal:      General: Bowel sounds are normal. There is no distension.      Palpations: Abdomen is soft.      Tenderness: There is no abdominal tenderness. There is no right CVA tenderness or left CVA tenderness.   Musculoskeletal:         General: No swelling. Normal range of motion.      Cervical back: Normal range of motion.      Right lower leg: No edema.      Left lower leg: No edema.   Skin:     General: Skin is warm and dry.      Capillary Refill: Capillary refill takes less than 2 seconds.      Coloration: Skin is pale.      Findings: Bruising and lesion (Scalp) present.   Neurological:      General: No focal deficit present.      Mental Status: He is alert and oriented to person, place, and time. Mental status is at baseline.   Psychiatric:         Mood and Affect: Mood normal.         Behavior: Behavior normal.         Thought Content: Thought content normal.         Judgment: Judgment normal.         Assessment & Plan  Primary hypertension    Orders:    Follow Up In Primary Care - Established    Controlled substance agreement signed    Orders:    DRUG SCREEN,URINE; Future    Routine general medical examination at health care facility    Orders:    1 Year Follow Up In Primary Care - Wellness Exam; Future

## 2025-03-19 LAB
AMPHETAMINES UR QL: NEGATIVE NG/ML
BARBITURATES UR QL: NEGATIVE NG/ML
BENZODIAZ UR QL: NEGATIVE NG/ML
BZE UR QL: NEGATIVE NG/ML
CREAT UR-MCNC: 90.4 MG/DL
METHADONE UR QL: NEGATIVE NG/ML
OPIATES UR QL: POSITIVE NG/ML
OXIDANTS UR QL: NEGATIVE MCG/ML
OXYCODONE UR QL: NEGATIVE NG/ML
PH UR: 6.1 [PH] (ref 4.5–9)
QUEST NOTES AND COMMENTS: ABNORMAL
THC UR QL: NEGATIVE NG/ML

## 2025-04-07 ENCOUNTER — TELEPHONE (OUTPATIENT)
Dept: PRIMARY CARE | Facility: CLINIC | Age: 70
End: 2025-04-07
Payer: MEDICARE

## 2025-04-07 DIAGNOSIS — K21.9 GASTROESOPHAGEAL REFLUX DISEASE, UNSPECIFIED WHETHER ESOPHAGITIS PRESENT: ICD-10-CM

## 2025-04-07 DIAGNOSIS — I10 PRIMARY HYPERTENSION: ICD-10-CM

## 2025-04-07 RX ORDER — LOSARTAN POTASSIUM 100 MG/1
100 TABLET ORAL DAILY
Qty: 90 TABLET | Refills: 3 | Status: SHIPPED | OUTPATIENT
Start: 2025-04-07 | End: 2026-04-07

## 2025-04-07 RX ORDER — OMEPRAZOLE 40 MG/1
40 CAPSULE, DELAYED RELEASE ORAL
Qty: 30 CAPSULE | Refills: 11 | Status: SHIPPED | OUTPATIENT
Start: 2025-04-07 | End: 2026-04-07

## 2025-04-08 ENCOUNTER — TELEPHONE (OUTPATIENT)
Dept: PRIMARY CARE | Facility: CLINIC | Age: 70
End: 2025-04-08
Payer: MEDICARE

## 2025-04-08 DIAGNOSIS — I10 HYPERTENSION, UNSPECIFIED TYPE: ICD-10-CM

## 2025-04-08 DIAGNOSIS — Z86.79 HISTORY OF CAD (CORONARY ARTERY DISEASE): ICD-10-CM

## 2025-04-08 RX ORDER — CARVEDILOL 6.25 MG/1
12.5 TABLET ORAL 2 TIMES DAILY
Qty: 360 TABLET | Refills: 1 | Status: SHIPPED | OUTPATIENT
Start: 2025-04-08

## 2025-04-10 ENCOUNTER — TELEPHONE (OUTPATIENT)
Dept: PRIMARY CARE | Facility: CLINIC | Age: 70
End: 2025-04-10
Payer: MEDICARE

## 2025-04-10 DIAGNOSIS — G89.29 OTHER CHRONIC PAIN: ICD-10-CM

## 2025-04-10 RX ORDER — HYDROCODONE BITARTRATE AND ACETAMINOPHEN 7.5; 325 MG/1; MG/1
1 TABLET ORAL 4 TIMES DAILY
Qty: 120 TABLET | Refills: 0 | Status: SHIPPED | OUTPATIENT
Start: 2025-04-10 | End: 2025-05-10

## 2025-04-22 ENCOUNTER — TELEPHONE (OUTPATIENT)
Dept: PHARMACY | Facility: HOSPITAL | Age: 70
End: 2025-04-22

## 2025-04-22 NOTE — TELEPHONE ENCOUNTER
I reviewed the telephone encounter and agree with the student’s findings and plans as written. Case discussed with student.    Brooke Hudson, PharmD

## 2025-04-22 NOTE — TELEPHONE ENCOUNTER
Population Health: Outreach by Ambulatory Pharmacy Team    Patient: Shahid ARNDT Abeba  Primary Care Provider (PCP): TONIO Guerra-CNP  Payor: [employer] [place]  Reason: Adherence  Medication(s): Losartan 100mg  Outcome: Patient Reached: Claims Adherence, Patient claims adherence and did not need refills at this time.    CHERY OLIVO

## 2025-05-07 ENCOUNTER — TELEPHONE (OUTPATIENT)
Dept: PRIMARY CARE | Facility: CLINIC | Age: 70
End: 2025-05-07
Payer: MEDICARE

## 2025-05-07 DIAGNOSIS — G89.29 OTHER CHRONIC PAIN: ICD-10-CM

## 2025-05-07 RX ORDER — HYDROCODONE BITARTRATE AND ACETAMINOPHEN 7.5; 325 MG/1; MG/1
1 TABLET ORAL 4 TIMES DAILY
Qty: 120 TABLET | Refills: 0 | Status: SHIPPED | OUTPATIENT
Start: 2025-05-07 | End: 2025-06-06

## 2025-05-07 NOTE — TELEPHONE ENCOUNTER
Rx Proposed 05/07/25 to [facility]. Proposed to Dr. Hart in Sandip absence. UDS is [facility]

## 2025-05-09 ENCOUNTER — TELEPHONE (OUTPATIENT)
Dept: PRIMARY CARE | Facility: CLINIC | Age: 70
End: 2025-05-09
Payer: MEDICARE

## 2025-05-09 DIAGNOSIS — K21.9 GASTROESOPHAGEAL REFLUX DISEASE, UNSPECIFIED WHETHER ESOPHAGITIS PRESENT: ICD-10-CM

## 2025-05-09 DIAGNOSIS — Z87.39 HISTORY OF GOUT: ICD-10-CM

## 2025-05-09 RX ORDER — ALLOPURINOL 100 MG/1
100 TABLET ORAL DAILY
Qty: 30 TABLET | Refills: 0 | Status: SHIPPED | OUTPATIENT
Start: 2025-05-09 | End: 2025-06-08

## 2025-05-09 RX ORDER — FAMOTIDINE 40 MG/1
40 TABLET, FILM COATED ORAL NIGHTLY
Qty: 30 TABLET | Refills: 0 | Status: SHIPPED | OUTPATIENT
Start: 2025-05-09 | End: 2025-06-08

## 2025-05-09 NOTE — TELEPHONE ENCOUNTER
Requesting refills on his Allopurinol and Famotidine and would like these sent to [facility] in [place].

## 2025-06-05 ENCOUNTER — TELEPHONE (OUTPATIENT)
Dept: PRIMARY CARE | Facility: CLINIC | Age: 70
End: 2025-06-05
Payer: MEDICARE

## 2025-06-05 DIAGNOSIS — G89.29 OTHER CHRONIC PAIN: ICD-10-CM

## 2025-06-05 RX ORDER — HYDROCODONE BITARTRATE AND ACETAMINOPHEN 7.5; 325 MG/1; MG/1
1 TABLET ORAL 4 TIMES DAILY
Qty: 120 TABLET | Refills: 0 | Status: SHIPPED | OUTPATIENT
Start: 2025-06-05 | End: 2025-07-05

## 2025-06-12 ENCOUNTER — OFFICE VISIT (OUTPATIENT)
Dept: PRIMARY CARE | Facility: CLINIC | Age: 70
End: 2025-06-12
Payer: MEDICARE

## 2025-06-12 VITALS
WEIGHT: 133 LBS | HEIGHT: 67 IN | OXYGEN SATURATION: 97 % | DIASTOLIC BLOOD PRESSURE: 84 MMHG | BODY MASS INDEX: 20.88 KG/M2 | SYSTOLIC BLOOD PRESSURE: 168 MMHG | HEART RATE: 72 BPM

## 2025-06-12 DIAGNOSIS — K21.9 GASTROESOPHAGEAL REFLUX DISEASE, UNSPECIFIED WHETHER ESOPHAGITIS PRESENT: ICD-10-CM

## 2025-06-12 DIAGNOSIS — L23.7 POISON IVY DERMATITIS: Primary | ICD-10-CM

## 2025-06-12 DIAGNOSIS — Z87.39 HISTORY OF GOUT: ICD-10-CM

## 2025-06-12 DIAGNOSIS — L60.0 INGROWING LEFT GREAT TOENAIL: ICD-10-CM

## 2025-06-12 PROCEDURE — 1159F MED LIST DOCD IN RCRD: CPT

## 2025-06-12 PROCEDURE — 3008F BODY MASS INDEX DOCD: CPT

## 2025-06-12 PROCEDURE — 99214 OFFICE O/P EST MOD 30 MIN: CPT

## 2025-06-12 PROCEDURE — 3077F SYST BP >= 140 MM HG: CPT

## 2025-06-12 PROCEDURE — 3079F DIAST BP 80-89 MM HG: CPT

## 2025-06-13 ENCOUNTER — TELEPHONE (OUTPATIENT)
Dept: PRIMARY CARE | Facility: CLINIC | Age: 70
End: 2025-06-13

## 2025-06-13 PROCEDURE — 11750 EXCISION NAIL&NAIL MATRIX: CPT

## 2025-06-13 RX ORDER — ALLOPURINOL 100 MG/1
100 TABLET ORAL DAILY
Qty: 30 TABLET | Refills: 0 | Status: SHIPPED | OUTPATIENT
Start: 2025-06-13 | End: 2025-07-13

## 2025-06-13 RX ORDER — TRIAMCINOLONE ACETONIDE 40 MG/ML
60 INJECTION, SUSPENSION INTRA-ARTICULAR; INTRAMUSCULAR ONCE
Status: SHIPPED | OUTPATIENT
Start: 2025-06-13

## 2025-06-13 RX ORDER — FAMOTIDINE 40 MG/1
40 TABLET, FILM COATED ORAL NIGHTLY
Qty: 30 TABLET | Refills: 0 | Status: SHIPPED | OUTPATIENT
Start: 2025-06-13 | End: 2025-07-13

## 2025-06-13 ASSESSMENT — ENCOUNTER SYMPTOMS
DIAPHORESIS: 0
NAUSEA: 0
RECTAL PAIN: 0
PALPITATIONS: 0
NUMBNESS: 0
CONSTIPATION: 0
WOUND: 0
HEADACHES: 0
SHORTNESS OF BREATH: 0
POLYPHAGIA: 0
TROUBLE SWALLOWING: 0
FREQUENCY: 0
FATIGUE: 0
UNEXPECTED WEIGHT CHANGE: 0
POLYDIPSIA: 0
ABDOMINAL PAIN: 0
CHEST TIGHTNESS: 0
DIARRHEA: 0
CHILLS: 0
MYALGIAS: 0
NERVOUS/ANXIOUS: 0
ARTHRALGIAS: 0
DIFFICULTY URINATING: 0
WEAKNESS: 0

## 2025-06-13 NOTE — TELEPHONE ENCOUNTER
Message from patient that he had his toenail done yesterday and he's wondering if he should soak it in Epsom salt like he did before?  Also wondering if he should have an ATB just to be safe?    Please advise

## 2025-06-13 NOTE — PROGRESS NOTES
Subjective   Patient ID: Shahid Us is a 70 y.o. male who presents for Poison Ivy (Bilateral rash on legs, itches. ) and Injury (Right leg hit by wood, redness /Left great toe hit by the piece of wood too ).    Patient presents today for complaint of poison ivy exposure.    Poison ivy/sumac: Patient was out in his yard in high grass has excoriation to bilateral lower legs with scattered erythematous macular papular rash of both legs.  No other body areas.  Was unable to tolerate oral prednisone in the past.  Has been using topical at home without effect.  Would like injection for treatment.  IM Kenalog 60 mg given.    Ingrowing toenail of left great toe: Patient has dropped a heavy object on his left great toe the medial and lateral sides of the toenail are embedded and causing pain.  Discussed options for treatment with patient including referral to podiatry, he would prefer that we remove ingrown toenail here in office.  Patient was moved to procedure room, anesthesia with 4 point ring block of great toe.  Nail edges successfully removed without complication or pain.  Toe dressed.  Discussed signs symptoms of infection with patient.  See procedure note below.    Poison [place]  Pertinent negatives include no diarrhea, fatigue or shortness of breath.   Injury  Pertinent negatives include no abdominal pain, chest pain, headaches, nausea, numbness, visual disturbance or weakness.        Patient ID: Shahid Us is a 70 y.o. male.    Nail Removal    Date/Time: 6/13/2025 5:53 AM    Performed by: GALEN Guerra  Authorized by: GALEN Guerra    Consent:     Consent obtained:  Written    Consent given by:  Patient    Risks, benefits, and alternatives were discussed: yes      Risks discussed:  Bleeding, incomplete removal, pain and infection    Alternatives discussed:  No treatment, delayed treatment and alternative treatment  Universal protocol:     Procedure explained and questions answered  to patient or proxy's satisfaction: yes      Site/side marked: yes      Immediately prior to procedure, a time out was called: yes      Patient identity confirmed:  Verbally with patient  Pre-procedure details:     Skin preparation:  Povidone-iodine    Preparation: Patient was prepped and draped in the usual sterile fashion    Procedure details:     Location:  Foot    Foot location:  L big toe  Anesthesia:     Anesthesia method:  Nerve block    Block location:  Left Great Toe    Block needle gauge:  25 G    Block anesthetic:  Lidocaine 1% w/o epi    Block technique:  4 sided ring block    Block injection procedure:  Negative aspiration for blood, anatomic landmarks palpated, anatomic landmarks identified and introduced needle    Block outcome:  Anesthesia achieved  Nail Removal:     Nail removed:  Partial    Nail side:  Medial  Ingrown nail:     Wedge excision of skin: no      Nail matrix removed or ablated:  Complete  Post-procedure details:     Dressing:  4x4 sterile gauze and gauze roll    Procedure completion:  Tolerated well, no immediate complications      Review of Systems   Constitutional:  Negative for chills, diaphoresis, fatigue and unexpected weight change.   HENT:  Negative for dental problem, tinnitus and trouble swallowing.    Eyes:  Negative for visual disturbance.   Respiratory:  Negative for chest tightness and shortness of breath.    Cardiovascular:  Negative for chest pain, palpitations and leg swelling.   Gastrointestinal:  Negative for abdominal pain, constipation, diarrhea, nausea and rectal pain.   Endocrine: Negative for polydipsia, polyphagia and polyuria.   Genitourinary:  Negative for difficulty urinating, frequency and urgency.   Musculoskeletal:  Negative for arthralgias and myalgias.   Skin:  Positive for rash. Negative for pallor and wound.   Neurological:  Negative for syncope, weakness, numbness and headaches.   Psychiatric/Behavioral:  Negative for suicidal ideas. The patient is  "not nervous/anxious.        Objective   /84 (BP Location: Right leg, Patient Position: Sitting, BP Cuff Size: Adult)   Pulse 72   Ht 1.695 m (5' 6.75\")   Wt 60.3 kg (133 lb)   SpO2 97%   BMI 20.99 kg/m²     Physical Exam  Vitals and nursing note reviewed.   Constitutional:       General: He is not in acute distress.     Appearance: Normal appearance.   HENT:      Head: Normocephalic.      Nose: Nose normal.      Mouth/Throat:      Mouth: Mucous membranes are moist.      Pharynx: Oropharynx is clear.   Eyes:      General: No scleral icterus.     Pupils: Pupils are equal, round, and reactive to light.   Neck:      Vascular: No carotid bruit.   Cardiovascular:      Rate and Rhythm: Normal rate and regular rhythm.      Pulses: Normal pulses.      Heart sounds: Normal heart sounds. No murmur heard.  Pulmonary:      Effort: Pulmonary effort is normal. No respiratory distress.      Breath sounds: Normal breath sounds. No stridor. No wheezing, rhonchi or rales.   Abdominal:      General: Bowel sounds are normal. There is no distension.      Palpations: Abdomen is soft.      Tenderness: There is no abdominal tenderness. There is no right CVA tenderness or left CVA tenderness.   Musculoskeletal:         General: No swelling. Normal range of motion.      Cervical back: Normal range of motion.      Right lower leg: No edema.      Left lower leg: No edema.   Skin:     General: Skin is warm and dry.      Capillary Refill: Capillary refill takes less than 2 seconds.      Findings: Erythema and rash (Bilateral lower legs) present.      Comments: Ingrowing toenail left great toe, medial and lateral   Neurological:      General: No focal deficit present.      Mental Status: He is alert and oriented to person, place, and time. Mental status is at baseline.   Psychiatric:         Mood and Affect: Mood normal.         Behavior: Behavior normal.         Thought Content: Thought content normal.         Judgment: Judgment " normal.         Assessment/Plan   Diagnoses and all orders for this visit:  Poison ivy dermatitis  -     triamcinolone acetonide (Kenalog-40) injection 60 mg  History of gout  -     allopurinol (Zyloprim) 100 mg tablet; Take 1 tablet (100 mg) by mouth once daily.  Gastroesophageal reflux disease, unspecified whether esophagitis present  -     famotidine (Pepcid) 40 mg tablet; Take 1 tablet (40 mg) by mouth once daily at bedtime.  Other orders  -     Nail Removal

## 2025-06-19 ENCOUNTER — APPOINTMENT (OUTPATIENT)
Dept: PRIMARY CARE | Facility: CLINIC | Age: 70
End: 2025-06-19
Payer: MEDICARE

## 2025-06-19 VITALS
SYSTOLIC BLOOD PRESSURE: 136 MMHG | WEIGHT: 132 LBS | HEART RATE: 76 BPM | OXYGEN SATURATION: 98 % | BODY MASS INDEX: 20.83 KG/M2 | DIASTOLIC BLOOD PRESSURE: 72 MMHG

## 2025-06-19 DIAGNOSIS — Z00.00 ROUTINE GENERAL MEDICAL EXAMINATION AT HEALTH CARE FACILITY: ICD-10-CM

## 2025-06-19 DIAGNOSIS — R42 VERTIGO: ICD-10-CM

## 2025-06-19 DIAGNOSIS — M48.061 NEUROFORAMINAL STENOSIS OF LUMBAR SPINE: ICD-10-CM

## 2025-06-19 DIAGNOSIS — I10 PRIMARY HYPERTENSION: ICD-10-CM

## 2025-06-19 DIAGNOSIS — D64.9 CHRONIC ANEMIA: ICD-10-CM

## 2025-06-19 DIAGNOSIS — Z79.899 CONTROLLED SUBSTANCE AGREEMENT SIGNED: Primary | ICD-10-CM

## 2025-06-19 PROCEDURE — 3078F DIAST BP <80 MM HG: CPT

## 2025-06-19 PROCEDURE — 1159F MED LIST DOCD IN RCRD: CPT

## 2025-06-19 PROCEDURE — 3075F SYST BP GE 130 - 139MM HG: CPT

## 2025-06-19 PROCEDURE — 1036F TOBACCO NON-USER: CPT

## 2025-06-19 PROCEDURE — 99214 OFFICE O/P EST MOD 30 MIN: CPT

## 2025-06-19 RX ORDER — MECLIZINE HCL 12.5 MG 12.5 MG/1
12.5 TABLET ORAL 3 TIMES DAILY PRN
Qty: 20 TABLET | Refills: 1 | Status: SHIPPED | OUTPATIENT
Start: 2025-06-19

## 2025-06-19 RX ORDER — NALOXONE HYDROCHLORIDE 4 MG/.1ML
SPRAY NASAL
COMMUNITY
Start: 2025-05-06

## 2025-06-19 ASSESSMENT — ENCOUNTER SYMPTOMS
DIAPHORESIS: 0
CONSTIPATION: 0
RECTAL PAIN: 0
ARTHRALGIAS: 0
FREQUENCY: 0
WOUND: 0
DIFFICULTY URINATING: 0
HEADACHES: 0
CHILLS: 0
TROUBLE SWALLOWING: 0
PALPITATIONS: 0
FATIGUE: 0
NAUSEA: 0
DIARRHEA: 0
POLYPHAGIA: 0
CHEST TIGHTNESS: 0
NERVOUS/ANXIOUS: 0
NUMBNESS: 0
POLYDIPSIA: 0
MYALGIAS: 0
SHORTNESS OF BREATH: 0
ABDOMINAL PAIN: 0
WEAKNESS: 0
UNEXPECTED WEIGHT CHANGE: 0

## 2025-06-19 NOTE — PROGRESS NOTES
Subjective   Patient ID: Shahid Us is a 70 y.o. male who presents for Med Management.    Patient presents today for med check    Arthritis/scoliosis: Has chronic back pain.  Uses hydrocodone for treatment.  Medications work at the current level.  CSA up-to-date (3/18/2025).  UDS updated today.    Poison ivy/poison sumac: Received Kenalog injection at last appointment, has mostly resolved.  Still has small patch on right anterior leg.  If not resolved in 1 week he will contact me for topical corticosteroid.    Vertigo: Uses meclizine for treatment.  Intermittent episodes.  Not increasing.  Medication effective.    Hypertension: 136/72 today in the office.  Controlled on his medications.  Denies any cardiovascular symptoms.    Squamous cell carcinoma: Seen and treated by Dr. Casanova.  Had complete removal, no reoccurrence.     Chronic anemia: Past GI bleed, hemoglobin at baseline.  No reported bleeding.     Preventive health: Colonoscopy due 2027, PSA 2024.  Pneumovax up-to-date.  Has LW and DPA.           Review of Systems   Constitutional:  Negative for chills, diaphoresis, fatigue and unexpected weight change.   HENT:  Negative for dental problem, tinnitus and trouble swallowing.    Eyes:  Negative for visual disturbance.   Respiratory:  Negative for chest tightness and shortness of breath.    Cardiovascular:  Negative for chest pain, palpitations and leg swelling.   Gastrointestinal:  Negative for abdominal pain, constipation, diarrhea, nausea and rectal pain.   Endocrine: Negative for polydipsia, polyphagia and polyuria.   Genitourinary:  Negative for difficulty urinating, frequency and urgency.   Musculoskeletal:  Negative for arthralgias and myalgias.   Skin:  Positive for rash (Right leg). Negative for pallor and wound.   Neurological:  Negative for syncope, weakness, numbness and headaches.   Psychiatric/Behavioral:  Negative for suicidal ideas. The patient is not nervous/anxious.        Objective   BP  136/72 (BP Location: Right arm, Patient Position: Sitting)   Pulse 76   Wt 59.9 kg (132 lb)   SpO2 98%   BMI 20.83 kg/m²     Physical Exam  Vitals and nursing note reviewed.   Constitutional:       General: He is not in acute distress.     Appearance: Normal appearance.   HENT:      Head: Normocephalic.      Nose: Nose normal.      Mouth/Throat:      Mouth: Mucous membranes are moist.      Pharynx: Oropharynx is clear.   Eyes:      General: No scleral icterus.     Pupils: Pupils are equal, round, and reactive to light.   Neck:      Vascular: No carotid bruit.   Cardiovascular:      Rate and Rhythm: Normal rate and regular rhythm.      Pulses: Normal pulses.      Heart sounds: Normal heart sounds. No murmur heard.  Pulmonary:      Effort: Pulmonary effort is normal. No respiratory distress.      Breath sounds: Normal breath sounds. No stridor. No wheezing, rhonchi or rales.   Abdominal:      General: Bowel sounds are normal. There is no distension.      Palpations: Abdomen is soft.      Tenderness: There is no abdominal tenderness. There is no right CVA tenderness or left CVA tenderness.   Musculoskeletal:         General: No swelling. Normal range of motion.      Cervical back: Normal range of motion.      Right lower leg: No edema.      Left lower leg: No edema.   Skin:     General: Skin is warm and dry.      Capillary Refill: Capillary refill takes less than 2 seconds.      Findings: Rash (Right anterior leg) present.   Neurological:      General: No focal deficit present.      Mental Status: He is alert and oriented to person, place, and time. Mental status is at baseline.   Psychiatric:         Mood and Affect: Mood normal.         Behavior: Behavior normal.         Thought Content: Thought content normal.         Judgment: Judgment normal.         Assessment/Plan   Diagnoses and all orders for this visit:  Controlled substance agreement signed  -     DRUG SCREEN,URINE; Future  Routine general medical  examination at health care facility  -     1 Year Follow Up In Primary Care - Wellness Exam  Vertigo  -     meclizine (Antivert) 12.5 mg tablet; Take 1 tablet (12.5 mg) by mouth 3 times a day as needed for dizziness.

## 2025-06-20 LAB
AMPHETAMINES UR QL: NEGATIVE NG/ML
BARBITURATES UR QL: NEGATIVE NG/ML
BENZODIAZ UR QL: NEGATIVE NG/ML
BZE UR QL: NEGATIVE NG/ML
CREAT UR-MCNC: 171.2 MG/DL
FENTANYL UR QL SCN: NEGATIVE NG/ML
METHADONE UR QL: NEGATIVE NG/ML
OPIATES UR QL: POSITIVE NG/ML
OXIDANTS UR QL: NEGATIVE MCG/ML
OXYCODONE UR QL: NEGATIVE NG/ML
PH UR: 6.1 [PH] (ref 4.5–9)
QUEST NOTES AND COMMENTS: ABNORMAL
THC UR QL: NEGATIVE NG/ML

## 2025-06-27 ENCOUNTER — TELEPHONE (OUTPATIENT)
Dept: PRIMARY CARE | Facility: CLINIC | Age: 70
End: 2025-06-27
Payer: MEDICARE

## 2025-06-27 DIAGNOSIS — R11.0 NAUSEA: Primary | ICD-10-CM

## 2025-06-27 DIAGNOSIS — L23.7 POISON IVY DERMATITIS: ICD-10-CM

## 2025-06-27 RX ORDER — PROCHLORPERAZINE MALEATE 10 MG
10 TABLET ORAL 3 TIMES DAILY PRN
Qty: 21 TABLET | Refills: 0 | Status: SHIPPED | OUTPATIENT
Start: 2025-06-27 | End: 2025-07-04

## 2025-06-27 RX ORDER — PREDNISONE 5 MG/1
TABLET ORAL
Qty: 78 TABLET | Refills: 0 | Status: SHIPPED | OUTPATIENT
Start: 2025-06-27

## 2025-06-27 NOTE — TELEPHONE ENCOUNTER
Spoke to pt and let him know what PCP advised. He expressed a verbal understanding and nothing further was needed at this time.

## 2025-06-27 NOTE — TELEPHONE ENCOUNTER
Reports his poison ivy is back and reports his cat brought fleas in his house and he has flea bites and wants to know if he should go the ER or if we could help him.

## 2025-06-27 NOTE — TELEPHONE ENCOUNTER
Let him know prescriptions were sent.  He can take the nausea medication about 30 minutes before he takes the prednisone.  I suggest that he takes the nausea medication, then eat something, then take the prednisone afterwards in the morning.  If he has additional nausea he can take the nausea medication a couple more times during the day, hopefully he will not need it though.

## 2025-06-27 NOTE — TELEPHONE ENCOUNTER
Changed his mind he would like an oral medication for this poison ivy and flea bites to [facility] and is requesting something for his stomach.

## 2025-07-02 ENCOUNTER — TELEPHONE (OUTPATIENT)
Dept: PRIMARY CARE | Facility: CLINIC | Age: 70
End: 2025-07-02
Payer: MEDICARE

## 2025-07-02 DIAGNOSIS — G89.29 OTHER CHRONIC PAIN: ICD-10-CM

## 2025-07-02 DIAGNOSIS — N18.31 STAGE 3A CHRONIC KIDNEY DISEASE (MULTI): ICD-10-CM

## 2025-07-02 RX ORDER — AZATHIOPRINE 50 MG/1
25 TABLET ORAL EVERY OTHER DAY
Qty: 30 TABLET | Refills: 3 | Status: SHIPPED | OUTPATIENT
Start: 2025-07-02

## 2025-07-02 RX ORDER — HYDROCODONE BITARTRATE AND ACETAMINOPHEN 7.5; 325 MG/1; MG/1
1 TABLET ORAL 4 TIMES DAILY
Qty: 120 TABLET | Refills: 0 | Status: SHIPPED | OUTPATIENT
Start: 2025-07-02 | End: 2025-08-01

## 2025-07-07 ENCOUNTER — TELEPHONE (OUTPATIENT)
Dept: URGENT CARE | Facility: CLINIC | Age: 70
End: 2025-07-07

## 2025-07-07 ENCOUNTER — TELEPHONE (OUTPATIENT)
Dept: PRIMARY CARE | Facility: CLINIC | Age: 70
End: 2025-07-07
Payer: MEDICARE

## 2025-07-07 ENCOUNTER — OFFICE VISIT (OUTPATIENT)
Dept: URGENT CARE | Facility: CLINIC | Age: 70
End: 2025-07-07
Payer: MEDICARE

## 2025-07-07 VITALS
TEMPERATURE: 97.4 F | OXYGEN SATURATION: 97 % | DIASTOLIC BLOOD PRESSURE: 93 MMHG | SYSTOLIC BLOOD PRESSURE: 163 MMHG | HEIGHT: 67 IN | WEIGHT: 132 LBS | BODY MASS INDEX: 20.72 KG/M2 | HEART RATE: 95 BPM

## 2025-07-07 DIAGNOSIS — K21.9 GASTROESOPHAGEAL REFLUX DISEASE, UNSPECIFIED WHETHER ESOPHAGITIS PRESENT: ICD-10-CM

## 2025-07-07 DIAGNOSIS — J02.9 PHARYNGITIS, UNSPECIFIED ETIOLOGY: ICD-10-CM

## 2025-07-07 DIAGNOSIS — N40.1 BENIGN PROSTATIC HYPERPLASIA WITH LOWER URINARY TRACT SYMPTOMS, SYMPTOM DETAILS UNSPECIFIED: ICD-10-CM

## 2025-07-07 DIAGNOSIS — B34.9 NONSPECIFIC SYNDROME SUGGESTIVE OF VIRAL ILLNESS: Primary | ICD-10-CM

## 2025-07-07 DIAGNOSIS — Z87.39 HISTORY OF GOUT: ICD-10-CM

## 2025-07-07 LAB — POC RAPID STREP: NEGATIVE

## 2025-07-07 PROCEDURE — 87880 STREP A ASSAY W/OPTIC: CPT | Performed by: NURSE PRACTITIONER

## 2025-07-07 PROCEDURE — 99213 OFFICE O/P EST LOW 20 MIN: CPT | Performed by: NURSE PRACTITIONER

## 2025-07-07 RX ORDER — CETIRIZINE HYDROCHLORIDE 10 MG/1
10 TABLET ORAL DAILY
Qty: 10 TABLET | Refills: 0 | Status: SHIPPED | OUTPATIENT
Start: 2025-07-07 | End: 2025-07-17

## 2025-07-07 RX ORDER — AZITHROMYCIN 250 MG/1
TABLET, FILM COATED ORAL
Qty: 6 TABLET | Refills: 0 | Status: SHIPPED | OUTPATIENT
Start: 2025-07-07 | End: 2025-07-07 | Stop reason: WASHOUT

## 2025-07-07 NOTE — PROGRESS NOTES
[facility]   DOYLE NOTE:      Name: Shahid Us, 70 y.o.    CSN:[id number]   MRN:[id number]      ALL:  Allergies[1]      Chief Complaint: Sore Throat (Sore throat X 2 days )    Encounter Date: 7/7/2025      HPI: The history was obtained from the patient. Shahid is a 70 y.o. male, who presents with a chief complaint of throat pain, which began 2 days ago. The pain is constant, with a sudden onset. It is rated as moderate pain.The pain is aggravated by swallowing, eating, or drinking and relieved by tylenol/motrin. There is associated difficulty swallowing, swollen lymph nodes. Denies shortness of breath, chest pain, or a rash.  Reports exposure to sick contacts. The patient has tried over-the-counter medications, home remedies without significant relief.     PMHx:    Medical History[2]        Current Medications[3]      PMSx:  Surgical History[4]    Fam Hx: Family History[5]    SOC. Hx:     Social History     Socioeconomic History    Marital status:      Spouse name: Not on file    Number of children: Not on file    Years of education: Not on file    Highest education level: Not on file   Occupational History    Not on file   Tobacco Use    Smoking status: Never    Smokeless tobacco: Never   Vaping Use    Vaping status: Never Used   Substance and Sexual Activity    Alcohol use: Never    Drug use: Never    Sexual activity: Not on file   Other Topics Concern    Not on file   Social History Narrative    Not on file     Social Drivers of Health     Financial Resource Strain: Not on file   Food Insecurity: Not on file   Transportation Needs: Not on file   Physical Activity: Not on file   Stress: Not on file   Social Connections: Not on file   Intimate Partner Violence: Not on file   Housing Stability: Not on file         Vitals:    07/07/25 1545   BP: (!) 163/93   Pulse: 95   Temp: 36.3 °C (97.4 °F)   SpO2: 97%     59.9 kg (132 lb)          Physical Exam  Vitals and nursing note reviewed.    Constitutional:       General: He is not in acute distress.     Appearance: Normal appearance. He is not ill-appearing.   HENT:      Head: Normocephalic and atraumatic.      Mouth/Throat:      Mouth: Mucous membranes are moist.      Pharynx: Pharyngeal swelling and posterior oropharyngeal erythema present. No oropharyngeal exudate.      Tonsils: No tonsillar exudate.   Cardiovascular:      Rate and Rhythm: Normal rate and regular rhythm.      Heart sounds: Normal heart sounds.   Pulmonary:      Effort: Pulmonary effort is normal.      Breath sounds: Normal breath sounds.   Abdominal:      General: Bowel sounds are normal.   Skin:     General: Skin is warm and dry.      Capillary Refill: Capillary refill takes less than 2 seconds.   Neurological:      Mental Status: He is alert and oriented to person, place, and time.           LABORATORY @ RADIOLOGICAL IMAGING (if done):     Results for orders placed or performed in visit on 07/07/25 (from the past 24 hours)   POCT rapid strep A manually resulted   Result Value Ref Range    POC Rapid Strep Negative Negative       ____________________________________________________________________    I did personally review Shahid's past medical history, surgical history, social history, as well as family history (when relevant).  In this case, I also oversaw the his drug management by reviewing his medication list, allergy list, as well as the medications that I prescribed during the UC course and/or recommended as an out-patient (including possible OTC medications such as acetaminophen, NSAIDs , etc).    After reviewing the items above, I did look at previous medical documentation, such as recent hospitalizations, office visits, and/or recent consultations with PCP/specialist.                          SDOH:   Another factor that I considered in Shahid's care was his Social Determinants of Health (SDOH). During this UC encounter, he did not have social determinants of health. Those  SDOH influencing Shahid's care are: none      _____________________________________________________________________       COURSE/MEDICAL DECISION MAKING:    Shahid is a 70 y.o., who presents with a working diagnosis of   1. Nonspecific syndrome suggestive of viral illness    2. Pharyngitis, unspecified etiology     with a differential to include: Influenza, parainfluenza, rhinovirus, adenovirus, metapneumovirus, coronavirus, COVID-19, postnasal drip, strep pharyngitis, GERD, retropharyngeal abscess, tonsillitis, adenitis, seasonal allergies      Plan:   PCR strep negative  Zyrtec once daily for the next 10 days, continue Flonase  Return to urgent care, primary care provider, or emergency department with worsening symptoms      No red flags on exam. Plan of care reviewed with patient, agreeable to discharge      LUIS M Heard DNP   Advanced Practice Provider  [facility]    Please note: While the patient may or may not have received printed discharge paperwork, all relevant medical findings, test results, and treatment details are accessible through the electronic medical record system. The patient is encouraged to review their chart via the patient portal for comprehensive information and follow-up instructions.         [1]   Allergies  Allergen Reactions    Lisinopril Anaphylaxis    Atorvastatin Other     Muscle pain    Sulfa (Sulfonamide Antibiotics) Other     Vomiting   [2]   Past Medical History:  Diagnosis Date    Acute post-traumatic headache, intractable 07/09/2021    Intractable acute post-traumatic headache    Aneurysm of the ascending aorta, without rupture 03/08/2022    Ascending aortic aneurysm    Angioneurotic edema, initial encounter 02/07/2022    Angioedema, initial encounter    Benign prostatic hyperplasia with lower urinary tract symptoms 11/08/2021    Benign prostatic hyperplasia with lower urinary tract symptoms    Corns and callosities 05/06/2021    Foot callus    COVID-19  09/12/2022    COVID    Deficiency of other specified B group vitamins 03/08/2022    Low serum vitamin B12    Disorder of bone, unspecified 04/15/2020    Lesion of right femur    Encounter for immunization 11/08/2021    Encounter for immunization    Encounter for screening for malignant neoplasm of colon 08/06/2021    Screen for colon cancer    Injury of peritoneum, initial encounter 12/14/2022    Hematoma of omentum    Kidney transplant status 01/31/2022    History of renal transplant    Long term (current) use of opiate analgesic 01/05/2021    Long term prescription opiate use    Low back pain, unspecified 11/08/2021    Chronic low back pain    Old myocardial infarction 01/12/2022    H/O myocardial infarction, greater than 8 weeks    Other conditions influencing health status 11/18/2020    Testicular/scrotal pain    Other fecal abnormalities 10/12/2021    Positive colorectal cancer screening using Cologuard test    Other intervertebral disc degeneration, lumbosacral region 11/08/2021    DDD (degenerative disc disease), lumbosacral    Other long term (current) drug therapy 08/06/2021    Encounter for long-term current use of medication    Other specified abnormal findings of blood chemistry 08/17/2020    Elevated TSH    Other specified disorders of kidney and ureter 09/02/2020    Left renal mass    Personal history of other diseases of male genital organs 04/05/2022    History of epididymitis    Personal history of other diseases of the circulatory system 11/08/2021    History of coronary artery disease    Personal history of other diseases of the circulatory system 12/12/2019    History of mitral valve prolapse    Personal history of other diseases of the circulatory system 03/29/2022    History of aortic valve disorder    Personal history of other drug therapy 03/08/2022    History of drug therapy    Personal history of other specified conditions 11/08/2021    History of multiple pulmonary nodules     Post-traumatic headache, unspecified, not intractable 07/09/2021    Post-traumatic headache, not intractable, unspecified chronicity pattern    Radiculopathy, lumbar region 08/26/2021    Lumbar radiculitis    Spondylosis without myelopathy or radiculopathy, lumbar region 09/15/2021    Lumbar spondylosis    Unspecified injury of head, initial encounter 08/06/2021    Injury of head, initial encounter   [3]   Current Outpatient Medications   Medication Sig Dispense Refill    allopurinol (Zyloprim) 100 mg tablet Take 1 tablet (100 mg) by mouth once daily. 30 tablet 0    amLODIPine (Norvasc) 5 mg tablet take 1 tablet by mouth once daily if needed IF BLOOD PRESSURE IS >140 TOP NUMBER      aspirin 81 mg EC tablet Take 1 tablet (81 mg) by mouth once daily.      azaTHIOprine (Imuran) 50 mg tablet Take 0.5 tablets (25 mg) by mouth every other day. 30 tablet 3    buPROPion SR (Wellbutrin SR) 150 mg 12 hr tablet Take 1 tablet (150 mg) by mouth 2 times a day. 180 tablet 3    carvedilol (Coreg) 6.25 mg tablet Take 2 tablets (12.5 mg) by mouth 2 times a day. 360 tablet 1    cholecalciferol (Vitamin D-3) 1,250 mcg (50,000 unit) capsule Take 1 capsule (50,000 Units) by mouth 1 (one) time per week.      famotidine (Pepcid) 40 mg tablet Take 1 tablet (40 mg) by mouth once daily at bedtime. 30 tablet 0    fluticasone (Flonase) 50 mcg/actuation nasal spray Administer 1 spray into each nostril once daily as needed.      HYDROcodone-acetaminophen (Norco) 7.5-325 mg tablet Take 1 tablet by mouth 4 times a day. 120 tablet 0    isosorbide mononitrate ER (Imdur) 30 mg 24 hr tablet Take 1 tablet (30 mg) by mouth once daily. 90 tablet 2    lactobacillus acidophilus (Lactobacillus acidoph-L.bulgar) tablet tablet Take 1 tablet by mouth 2 times a day.      losartan (Cozaar) 100 mg tablet Take 1 tablet (100 mg) by mouth once daily. 90 tablet 3    meclizine (Antivert) 12.5 mg tablet Take 1 tablet (12.5 mg) by mouth 3 times a day as needed for  dizziness. 20 tablet 1    naloxone (Narcan) 4 mg/0.1 mL nasal spray ADMINISTER 1 SPRAY INTO 1 NOSTRIL FOR KNOWN OR SUSPECTED OPIOID O...  (REFER TO PRESCRIPTION NOTES).      nitroglycerin (Nitrostat) 0.4 mg SL tablet Place 1 tablet (0.4 mg) under the tongue every 5 minutes if needed for chest pain. Up to 3 doses.      omeprazole (PriLOSEC) 40 mg DR capsule Take 1 capsule (40 mg) by mouth once daily in the morning. Take before meals. 30 capsule 11    predniSONE (Deltasone) 5 mg tablet Take 12 tabs (60 mg) by mouth on day 1, then 11 tabs (55 mg) by mouth on day 2. Continue to decrease by 1 tab (5 mg)  every day until gone (12 days). 78 tablet 0    simvastatin (Zocor) 20 mg tablet Take 1 tablet (20 mg) by mouth once daily at bedtime.      tamsulosin (Flomax) 0.4 mg 24 hr capsule Take 1 capsule (0.4 mg) by mouth once daily at bedtime. 90 capsule 3    cetirizine (ZyrTEC) 10 mg tablet Take 1 tablet (10 mg) by mouth once daily for 10 days. 10 tablet 0     Current Facility-Administered Medications   Medication Dose Route Frequency Provider Last Rate Last Admin    triamcinolone acetonide (Kenalog-40) injection 60 mg  60 mg intramuscular Once TONIO Guerra-CNP       [4]   Past Surgical History:  Procedure Laterality Date    MR HEAD ANGIO WO IV CONTRAST  02/04/2022    MR HEAD ANGIO WO IV CONTRAST 2/4/2022 [facility] EM[facility]GENCY [facility]GAC[facility]    MR NECK ANGIO WO IV CONTRAST  02/04/2022    MR NECK ANGIO WO IV CONTRAST 2/4/2022 [facility] EMERGENCY LEGACY    OTHER SURGICAL HISTORY  12/14/2022    Kidney transplantation    OTHER SURGICAL HISTORY  10/28/2022    Colonoscopy    OTHER SURGICAL HISTORY  12/12/2019    Hip replacement    OTHER SURGICAL HISTORY  12/12/2019    Hernia repair    SKIN CANCER EXCISION  03/2025    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

## 2025-07-07 NOTE — TELEPHONE ENCOUNTER
Requesting refills for Allopurinol 100 mg  1 daily  Famotidine 40 mg 1 at bedtime  Finasteride 5 mg 1 daily  90 day supply    [facility]

## 2025-07-07 NOTE — TELEPHONE ENCOUNTER
Result Communication    Resulted Orders   POCT rapid strep A manually resulted   Result Value Ref Range    POC Rapid Strep Negative Negative       4:29 PM      Results were successfully communicated with the patient and they acknowledged their understanding.

## 2025-07-08 RX ORDER — ALLOPURINOL 100 MG/1
100 TABLET ORAL DAILY
Qty: 90 TABLET | Refills: 1 | Status: SHIPPED | OUTPATIENT
Start: 2025-07-08

## 2025-07-08 RX ORDER — FAMOTIDINE 40 MG/1
40 TABLET, FILM COATED ORAL NIGHTLY
Qty: 90 TABLET | Refills: 1 | Status: SHIPPED | OUTPATIENT
Start: 2025-07-08

## 2025-07-08 RX ORDER — FINASTERIDE 5 MG/1
5 TABLET, FILM COATED ORAL DAILY
Qty: 90 TABLET | Refills: 1 | Status: SHIPPED | OUTPATIENT
Start: 2025-07-08 | End: 2026-01-04

## 2025-07-12 NOTE — TELEPHONE ENCOUNTER
PULMONARY FUNCTION TESTING INTERPRETATION        Spirometry:  Forced vital capacity (FVC) is: decreased  Forced expiratory volume in 1 second (FEV1) is: decreased  FEV1/FVC ratio: increased  Significant bronchodilator response? No         Flow Volume Loop:  Narrowed        Lung Volume:  Total lung capacity (TLC) is: nl  Residual volume (RV) is: nl         Diffusing Capacity:  Normal         Interpretation:  Mildly decreased spirometry values.  There is no significant bronchodilator response.  This does not preclude the use of bronchodilator therapy if clinically indicated.  Normal flow volume loop.   Borderline restrictive ventilatory pattern.  Normal diffusing capacity.   Correlate clinically.  The PFT raw data is available in EPIC EMR under CHART REVIEW under the PROCEDURES tab. Please click on the the PFT and there should be an attached hyperlink which shows the attached test when clicked on.     Electronically signed by    NAT Biggs DO, MPH  Pulmonary Critical Care Medicine  [facility] Critical [facility]           Rx proposed

## 2025-07-15 LAB
ALBUMIN SERPL-MCNC: 3.5 G/DL (ref 3.6–5.1)
ALP SERPL-CCNC: 59 U/L (ref 35–144)
ALT SERPL-CCNC: 9 U/L (ref 9–46)
ANION GAP SERPL CALCULATED.4IONS-SCNC: 7 MMOL/L (CALC) (ref 7–17)
AST SERPL-CCNC: 13 U/L (ref 10–35)
BILIRUB SERPL-MCNC: 0.5 MG/DL (ref 0.2–1.2)
BUN SERPL-MCNC: 12 MG/DL (ref 7–25)
CALCIUM SERPL-MCNC: 9 MG/DL (ref 8.6–10.3)
CHLORIDE SERPL-SCNC: 105 MMOL/L (ref 98–110)
CO2 SERPL-SCNC: 29 MMOL/L (ref 20–32)
CREAT SERPL-MCNC: 1.32 MG/DL (ref 0.7–1.35)
EGFRCR SERPLBLD CKD-EPI 2021: 58 ML/MIN/1.73M2
GLUCOSE SERPL-MCNC: 80 MG/DL (ref 65–99)
POTASSIUM SERPL-SCNC: 4.1 MMOL/L (ref 3.5–5.3)
PROT SERPL-MCNC: 5.2 G/DL (ref 6.1–8.1)
SODIUM SERPL-SCNC: 141 MMOL/L (ref 135–146)
URATE SERPL-MCNC: 4 MG/DL (ref 4–8)

## 2025-07-16 ENCOUNTER — APPOINTMENT (OUTPATIENT)
Dept: NEPHROLOGY | Facility: CLINIC | Age: 70
End: 2025-07-16
Payer: MEDICARE

## 2025-07-16 VITALS
DIASTOLIC BLOOD PRESSURE: 80 MMHG | BODY MASS INDEX: 19.93 KG/M2 | SYSTOLIC BLOOD PRESSURE: 142 MMHG | HEART RATE: 80 BPM | WEIGHT: 127 LBS | HEIGHT: 67 IN

## 2025-07-16 DIAGNOSIS — N18.31 STAGE 3A CHRONIC KIDNEY DISEASE (MULTI): Primary | ICD-10-CM

## 2025-07-16 DIAGNOSIS — E78.2 MIXED HYPERLIPIDEMIA: ICD-10-CM

## 2025-07-16 DIAGNOSIS — Z94.0 HISTORY OF KIDNEY TRANSPLANT (HHS-HCC): ICD-10-CM

## 2025-07-16 DIAGNOSIS — I10 PRIMARY HYPERTENSION: ICD-10-CM

## 2025-07-16 PROCEDURE — 99213 OFFICE O/P EST LOW 20 MIN: CPT | Performed by: CLINICAL NURSE SPECIALIST

## 2025-07-16 PROCEDURE — 1036F TOBACCO NON-USER: CPT | Performed by: CLINICAL NURSE SPECIALIST

## 2025-07-16 PROCEDURE — 3079F DIAST BP 80-89 MM HG: CPT | Performed by: CLINICAL NURSE SPECIALIST

## 2025-07-16 PROCEDURE — 3008F BODY MASS INDEX DOCD: CPT | Performed by: CLINICAL NURSE SPECIALIST

## 2025-07-16 PROCEDURE — 3077F SYST BP >= 140 MM HG: CPT | Performed by: CLINICAL NURSE SPECIALIST

## 2025-07-16 PROCEDURE — 1159F MED LIST DOCD IN RCRD: CPT | Performed by: CLINICAL NURSE SPECIALIST

## 2025-07-16 PROCEDURE — 1160F RVW MEDS BY RX/DR IN RCRD: CPT | Performed by: CLINICAL NURSE SPECIALIST

## 2025-07-16 ASSESSMENT — ENCOUNTER SYMPTOMS
CARDIOVASCULAR NEGATIVE: 1
EYES NEGATIVE: 1
BACK PAIN: 1
CONSTITUTIONAL NEGATIVE: 1
HEMATOLOGIC/LYMPHATIC NEGATIVE: 1
ALLERGIC/IMMUNOLOGIC NEGATIVE: 1
PSYCHIATRIC NEGATIVE: 1
RESPIRATORY NEGATIVE: 1
NEUROLOGICAL NEGATIVE: 1
ENDOCRINE NEGATIVE: 1
GASTROINTESTINAL NEGATIVE: 1

## 2025-07-16 NOTE — PROGRESS NOTES
Subjective   Patient ID: Shahid Us is a 70 y.o. male who presents for Follow-up (6 months/Review labs 7/14).  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 with baseline chronic kidney disease and hypertension    Labs reviewed  Glucose 80  Renal functions BUN of 12 and creatinine of 1.32, GFR is 58  Sodium 141, potassium 4.1, chloride 105, bicarb 29  Calcium 9.0  Protein 5.2 with albumin of 3.5  Uric acid 4.0    His biggest complaint at this time is a sore throat, he went to the emergency room for this and it was recommended that he use Zyrtec  Blood pressure has been well-controlled  He does watch it at home and his systolic is always less than 150  He does have Norvasc that he is supposed to take as needed for blood pressure greater than 150 which she has not used, I did tell him that this was not a good as needed medication and at this time we will discontinue it, he will call the office if his blood pressure is consistently greater than 150  He has no edema wear without difficulties        Review of Systems   Constitutional: Negative.    HENT: Negative.     Eyes: Negative.    Respiratory: Negative.     Cardiovascular: Negative.    Gastrointestinal: Negative.    Endocrine: Negative.    Genitourinary: Negative.    Musculoskeletal:  Positive for back pain.   Skin: Negative.    Allergic/Immunologic: Negative.    Neurological: Negative.    Hematological: Negative.    Psychiatric/Behavioral: Negative.         Objective   Physical Exam  Constitutional:       Appearance: Normal appearance.   HENT:      Head: Normocephalic and atraumatic.      Mouth/Throat:      Mouth: Mucous membranes are moist.     Eyes:      Extraocular Movements: Extraocular movements intact.       Cardiovascular:      Rate and Rhythm: Normal rate and regular rhythm.      Heart sounds: Normal heart sounds.   Pulmonary:      Effort: Pulmonary effort is normal.      Breath sounds: Normal breath  sounds.   Abdominal:      General: Bowel sounds are normal.      Palpations: Abdomen is soft.     Musculoskeletal:         General: Normal range of motion.     Skin:     General: Skin is warm and dry.     Neurological:      Mental Status: He is alert.     Psychiatric:         Behavior: Behavior normal.         Thought Content: Thought content normal.         Assessment/Plan   Problem List Items Addressed This Visit           ICD-10-CM    Hyperlipidemia E78.5    Relevant Orders    Lipid panel    Stage 3 chronic kidney disease (Multi) - Primary N18.30    Relevant Orders    Comprehensive Metabolic Panel    Uric Acid    Comprehensive metabolic panel    CBC    Follow Up In Nephrology    History of kidney transplant ([facility]) Z94.0    Relevant Orders    Comprehensive metabolic panel    CBC    Follow Up In Nephrology    Hypertension I10     Renal transplant from sister in 1979  History of glomerulonephritis  Chronic kidney disease with baseline creatinine 1.5-1.7:  Bilateral atrophic kidneys native  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

## 2025-07-29 ENCOUNTER — OFFICE VISIT (OUTPATIENT)
Dept: URGENT CARE | Facility: CLINIC | Age: 70
End: 2025-07-29
Payer: MEDICARE

## 2025-07-29 ENCOUNTER — HOSPITAL ENCOUNTER (OUTPATIENT)
Dept: RADIOLOGY | Facility: HOSPITAL | Age: 70
Discharge: HOME | End: 2025-07-29
Payer: MEDICARE

## 2025-07-29 VITALS
OXYGEN SATURATION: 95 % | HEART RATE: 86 BPM | DIASTOLIC BLOOD PRESSURE: 92 MMHG | SYSTOLIC BLOOD PRESSURE: 177 MMHG | BODY MASS INDEX: 19.93 KG/M2 | WEIGHT: 127 LBS | TEMPERATURE: 97.6 F | HEIGHT: 67 IN

## 2025-07-29 DIAGNOSIS — T14.8XXA CRUSH INJURY: ICD-10-CM

## 2025-07-29 DIAGNOSIS — T14.8XXA CRUSH INJURY: Primary | ICD-10-CM

## 2025-07-29 PROCEDURE — 73590 X-RAY EXAM OF LOWER LEG: CPT | Mod: RT

## 2025-07-29 PROCEDURE — 99213 OFFICE O/P EST LOW 20 MIN: CPT | Performed by: NURSE PRACTITIONER

## 2025-07-29 PROCEDURE — 73590 X-RAY EXAM OF LOWER LEG: CPT | Mod: RIGHT SIDE | Performed by: STUDENT IN AN ORGANIZED HEALTH CARE EDUCATION/TRAINING PROGRAM

## 2025-07-29 NOTE — PROGRESS NOTES
[facility] URGENT CARE   DOYLE NOTE:      Name: Shahid Us, 70 y.o.    CSN:[id number]   MRN:[id number]      ALL:  Allergies[1]      Chief Complaint: Leg Injury (Crush injury to right lower leg X 4 days ago )    Encounter Date: 7/29/2025      HPI: The history was obtained from the patient. Shahid is a 70 y.o. male, who presents with a chief complaint of left leg injury from a wagon tongue crush injury that occurred 4 days ago.  Over the last 4 days has noted increased pain, swelling, and bruising.  He is able to bear weight without difficulty.  Pain worse with dorsiflex of the toes.  Denies any calf tenderness.  Utilizing Tylenol throughout the day and Norco at bedtime.  Denies any osteoporosis or osteopenia history    PMHx:    Medical History[2]        Current Medications[3]      PMSx:  Surgical History[4]    Fam Hx: Family History[5]    SOC. Hx:     Social History     Socioeconomic History    Marital status:      Spouse name: Not on file    Number of children: Not on file    Years of education: Not on file    Highest education level: Not on file   Occupational History    Not on file   Tobacco Use    Smoking status: Never    Smokeless tobacco: Never   Vaping Use    Vaping status: Never Used   Substance and Sexual Activity    Alcohol use: Never    Drug use: Never    Sexual activity: Not on file   Other Topics Concern    Not on file   Social History Narrative    Not on file     Social Drivers of Health     Financial Resource Strain: Not on file   Food Insecurity: Not on file   Transportation Needs: Not on file   Physical Activity: Not on file   Stress: Not on file   Social Connections: Not on file   Intimate Partner Violence: Not on file   Housing Stability: Not on file         Vitals:    07/29/25 1755   BP: (!) 177/92   Pulse: 86   Temp: 36.4 °C (97.6 °F)   SpO2: 95%     57.6 kg (127 lb)          Physical Exam  Vitals and nursing note reviewed.   Constitutional:       General: He is not in acute  distress.     Appearance: Normal appearance. He is not ill-appearing.   HENT:      Head: Normocephalic and atraumatic.      Mouth/Throat:      Mouth: Mucous membranes are moist.     Cardiovascular:      Rate and Rhythm: Normal rate and regular rhythm.      Heart sounds: Normal heart sounds.   Pulmonary:      Effort: Pulmonary effort is normal.      Breath sounds: Normal breath sounds.   Abdominal:      General: Bowel sounds are normal.     Skin:     General: Skin is warm and dry.      Capillary Refill: Capillary refill takes less than 2 seconds.           Comments: bruising     Neurological:      Mental Status: He is alert and oriented to person, place, and time.           LABORATORY @ RADIOLOGICAL IMAGING (if done):     X-ray ordered   ____________________________________________________________________    I did personally review Shahid's past medical history, surgical history, social history, as well as family history (when relevant).  In this case, I also oversaw the his drug management by reviewing his medication list, allergy list, as well as the medications that I prescribed during the UC course and/or recommended as an out-patient (including possible OTC medications such as acetaminophen, NSAIDs , etc).    After reviewing the items above, I did look at previous medical documentation, such as recent hospitalizations, office visits, and/or recent consultations with PCP/specialist.                          SDOH:   Another factor that I considered in Shahid's care was his Social Determinants of Health (SDOH). During this UC encounter, he did not have social determinants of health. Those SDOH influencing Shahid's care are: none      _____________________________________________________________________      UC COURSE/MEDICAL DECISION MAKING:    Shahid is a 70 y.o., who presents with a working diagnosis of   1. Crush injury     with a differential to include: Sprain, strain, contusion, fracture, avulsion fracture, dislocation,  tendinitis, tenosynovitis     Plan:   X-ray to rule out fracture  If fracture noted patient request referral to Dr. Joseph  Continue Tylenol and Norco as needed for pain  Return to urgent care, primary care provider, or emergency department with worsening symptoms      No red flags on exam. Plan of care reviewed with patient, agreeable to discharge      LUIS M Heard DNP   Advanced Practice Provider  [facility] URGENT CARE    Please note: While the patient may or may not have received printed discharge paperwork, all relevant medical findings, test results, and treatment details are accessible through the electronic medical record system. The patient is encouraged to review their chart via the patient portal for comprehensive information and follow-up instructions.         [1]   Allergies  Allergen Reactions    Lisinopril Anaphylaxis    Atorvastatin Other     Muscle pain    Sulfa (Sulfonamide Antibiotics) Other     Vomiting   [2]   Past Medical History:  Diagnosis Date    Acute post-traumatic headache, intractable 07/09/2021    Intractable acute post-traumatic headache    Aneurysm of the ascending aorta, without rupture 03/08/2022    Ascending aortic aneurysm    Angioneurotic edema, initial encounter 02/07/2022    Angioedema, initial encounter    Benign prostatic hyperplasia with lower urinary tract symptoms 11/08/2021    Benign prostatic hyperplasia with lower urinary tract symptoms    Corns and callosities 05/06/2021    Foot callus    COVID-19 09/12/2022    COVID    Deficiency of other specified B group vitamins 03/08/2022    Low serum vitamin B12    Disorder of bone, unspecified 04/15/2020    Lesion of right femur    Encounter for immunization 11/08/2021    Encounter for immunization    Encounter for screening for malignant neoplasm of colon 08/06/2021    Screen for colon cancer    Injury of peritoneum, initial encounter 12/14/2022    Hematoma of omentum    Kidney transplant status 01/31/2022     History of renal transplant    Long term (current) use of opiate analgesic 01/05/2021    Long term prescription opiate use    Low back pain, unspecified 11/08/2021    Chronic low back pain    Old myocardial infarction 01/12/2022    H/O myocardial infarction, greater than 8 weeks    Other conditions influencing health status 11/18/2020    Testicular/scrotal pain    Other fecal abnormalities 10/12/2021    Positive colorectal cancer screening using Cologuard test    Other intervertebral disc degeneration, lumbosacral region 11/08/2021    DDD (degenerative disc disease), lumbosacral    Other long term (current) drug therapy 08/06/2021    Encounter for long-term current use of medication    Other specified abnormal findings of blood chemistry 08/17/2020    Elevated TSH    Other specified disorders of kidney and ureter 09/02/2020    Left renal mass    Personal history of other diseases of male genital organs 04/05/2022    History of epididymitis    Personal history of other diseases of the circulatory system 11/08/2021    History of coronary artery disease    Personal history of other diseases of the circulatory system 12/12/2019    History of mitral valve prolapse    Personal history of other diseases of the circulatory system 03/29/2022    History of aortic valve disorder    Personal history of other drug therapy 03/08/2022    History of drug therapy    Personal history of other specified conditions 11/08/2021    History of multiple pulmonary nodules    Post-traumatic headache, unspecified, not intractable 07/09/2021    Post-traumatic headache, not intractable, unspecified chronicity pattern    Radiculopathy, lumbar region 08/26/2021    Lumbar radiculitis    Spondylosis without myelopathy or radiculopathy, lumbar region 09/15/2021    Lumbar spondylosis    Unspecified injury of head, initial encounter 08/06/2021    Injury of head, initial encounter   [3]   Current Outpatient Medications   Medication Sig Dispense Refill     allopurinol (Zyloprim) 100 mg tablet Take 1 tablet (100 mg) by mouth once daily. 90 tablet 1    aspirin 81 mg EC tablet Take 1 tablet (81 mg) by mouth once daily.      azaTHIOprine (Imuran) 50 mg tablet Take 0.5 tablets (25 mg) by mouth every other day. 30 tablet 3    buPROPion SR (Wellbutrin SR) 150 mg 12 hr tablet Take 1 tablet (150 mg) by mouth 2 times a day. 180 tablet 3    carvedilol (Coreg) 6.25 mg tablet Take 2 tablets (12.5 mg) by mouth 2 times a day. 360 tablet 1    cholecalciferol (Vitamin D-3) 1,250 mcg (50,000 unit) capsule Take 1 capsule (50,000 Units) by mouth 1 (one) time per week.      famotidine (Pepcid) 40 mg tablet Take 1 tablet (40 mg) by mouth once daily at bedtime. 90 tablet 1    finasteride (Proscar) 5 mg tablet Take 1 tablet (5 mg) by mouth once daily. 90 tablet 1    fluticasone (Flonase) 50 mcg/actuation nasal spray Administer 1 spray into each nostril once daily as needed.      HYDROcodone-acetaminophen (Norco) 7.5-325 mg tablet Take 1 tablet by mouth 4 times a day. 120 tablet 0    isosorbide mononitrate ER (Imdur) 30 mg 24 hr tablet Take 1 tablet (30 mg) by mouth once daily. 90 tablet 2    lactobacillus acidophilus (Lactobacillus acidoph-L.bulgar) tablet tablet Take 1 tablet by mouth 2 times a day.      losartan (Cozaar) 100 mg tablet Take 1 tablet (100 mg) by mouth once daily. 90 tablet 3    meclizine (Antivert) 12.5 mg tablet Take 1 tablet (12.5 mg) by mouth 3 times a day as needed for dizziness. 20 tablet 1    naloxone (Narcan) 4 mg/0.1 mL nasal spray ADMINISTER 1 SPRAY INTO 1 NOSTRIL FOR KNOWN OR SUSPECTED OPIOID O...  (REFER TO PRESCRIPTION NOTES).      nitroglycerin (Nitrostat) 0.4 mg SL tablet Place 1 tablet (0.4 mg) under the tongue every 5 minutes if needed for chest pain. Up to 3 doses.      omeprazole (PriLOSEC) 40 mg DR capsule Take 1 capsule (40 mg) by mouth once daily in the morning. Take before meals. 30 capsule 11    simvastatin (Zocor) 20 mg tablet Take 1 tablet (20 mg)  by mouth once daily at bedtime.      tamsulosin (Flomax) 0.4 mg 24 hr capsule Take 1 capsule (0.4 mg) by mouth once daily at bedtime. 90 capsule 3    cetirizine (ZyrTEC) 10 mg tablet Take 1 tablet (10 mg) by mouth once daily for 10 days. 10 tablet 0    predniSONE (Deltasone) 5 mg tablet Take 12 tabs (60 mg) by mouth on day 1, then 11 tabs (55 mg) by mouth on day 2. Continue to decrease by 1 tab (5 mg)  every day until gone (12 days). (Patient not taking: Reported on 7/29/2025) 78 tablet 0     Current Facility-Administered Medications   Medication Dose Route Frequency Provider Last Rate Last Admin    triamcinolone acetonide (Kenalog-40) injection 60 mg  60 mg intramuscular Once TONIO Guerra-CNP       [4]   Past Surgical History:  Procedure Laterality Date    MR HEAD ANGIO WO IV CONTRAST  02/04/2022    MR HEAD ANGIO WO IV CONTRAST 2/4/2022 [facility]    MR NECK ANGIO WO IV CONTRAST  02/04/2022    MR NECK ANGIO WO IV CONTRAST 2/4/2022 [facility] EMERGENCY [facility]GAC[facility]    OTHER SURGICAL HISTORY  12/14/2022    Kidney transplantation    OTHER SURGICAL HISTORY  10/28/2022    Colonoscopy    OTHER SURGICAL HISTORY  12/12/2019    Hip replacement    OTHER SURGICAL HISTORY  12/12/2019    Hernia repair    SKIN CANCER EXCISION  03/2025    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

## 2025-07-30 ENCOUNTER — RESULTS FOLLOW-UP (OUTPATIENT)
Dept: URGENT CARE | Facility: CLINIC | Age: 70
End: 2025-07-30
Payer: MEDICARE

## 2025-07-30 DIAGNOSIS — M87.00 AVASCULAR NECROSIS (MULTI): Primary | ICD-10-CM

## 2025-07-30 NOTE — TELEPHONE ENCOUNTER
[facility] URGENT CARE Telephone NOTE:    Name: Shahid Us, 70 y.o.    CSN:[id number]   MRN:[id number]    PCP: TONIO Guerra-CNP  ALL:  Allergies[1]      Date of call: 07/30/25  Time of Call: 8:59 AM    Connection was: made via phone    Spoke with: Patient      Purpose:  discuss lab results    Details:   Diagnostic tests were reviewed and questions answered. Diagnosis, care plan and treatment options were discussed. The patient understand instructions and will follow up as directed.  Order placed for physical therapy.  Patient has an established relationship with Dr. Joseph, advised to call today and schedule an appointment as soon as possible for further evaluation.             [1]   Allergies  Allergen Reactions    Lisinopril Anaphylaxis    Atorvastatin Other     Muscle pain    Sulfa (Sulfonamide Antibiotics) Other     Vomiting

## 2025-08-04 ENCOUNTER — TELEPHONE (OUTPATIENT)
Dept: PRIMARY CARE | Facility: CLINIC | Age: 70
End: 2025-08-04
Payer: MEDICARE

## 2025-08-04 DIAGNOSIS — G89.29 OTHER CHRONIC PAIN: ICD-10-CM

## 2025-08-05 RX ORDER — HYDROCODONE BITARTRATE AND ACETAMINOPHEN 7.5; 325 MG/1; MG/1
1 TABLET ORAL 4 TIMES DAILY
Qty: 120 TABLET | Refills: 0 | Status: SHIPPED | OUTPATIENT
Start: 2025-08-05 | End: 2025-09-04

## 2025-08-27 DIAGNOSIS — N40.1 BENIGN PROSTATIC HYPERPLASIA WITH LOWER URINARY TRACT SYMPTOMS, SYMPTOM DETAILS UNSPECIFIED: ICD-10-CM

## 2025-08-27 RX ORDER — TAMSULOSIN HYDROCHLORIDE 0.4 MG/1
0.4 CAPSULE ORAL NIGHTLY
Qty: 90 CAPSULE | Refills: 3 | Status: SHIPPED | OUTPATIENT
Start: 2025-08-27

## 2025-09-02 ENCOUNTER — TELEPHONE (OUTPATIENT)
Dept: PRIMARY CARE | Facility: CLINIC | Age: 70
End: 2025-09-02
Payer: MEDICARE

## 2025-09-02 DIAGNOSIS — G89.29 OTHER CHRONIC PAIN: ICD-10-CM

## 2025-09-02 RX ORDER — HYDROCODONE BITARTRATE AND ACETAMINOPHEN 7.5; 325 MG/1; MG/1
1 TABLET ORAL 4 TIMES DAILY
Qty: 120 TABLET | Refills: 0 | Status: SHIPPED | OUTPATIENT
Start: 2025-09-02 | End: 2025-10-02

## 2025-09-19 ENCOUNTER — APPOINTMENT (OUTPATIENT)
Dept: PRIMARY CARE | Facility: CLINIC | Age: 70
End: 2025-09-19
Payer: MEDICARE

## 2026-01-15 ENCOUNTER — APPOINTMENT (OUTPATIENT)
Dept: NEPHROLOGY | Facility: CLINIC | Age: 71
End: 2026-01-15
Payer: MEDICARE